# Patient Record
Sex: FEMALE | Race: WHITE | Employment: OTHER | URBAN - METROPOLITAN AREA
[De-identification: names, ages, dates, MRNs, and addresses within clinical notes are randomized per-mention and may not be internally consistent; named-entity substitution may affect disease eponyms.]

---

## 2017-11-07 PROBLEM — M17.11 PRIMARY OSTEOARTHRITIS OF RIGHT KNEE: Chronic | Status: ACTIVE | Noted: 2017-11-07

## 2017-12-04 ENCOUNTER — CARE COORDINATION (OUTPATIENT)
Dept: CASE MANAGEMENT | Age: 72
End: 2017-12-04

## 2018-03-14 ENCOUNTER — PREP FOR PROCEDURE (OUTPATIENT)
Dept: SURGERY | Age: 73
End: 2018-03-14

## 2018-03-14 ASSESSMENT — ENCOUNTER SYMPTOMS
EYES NEGATIVE: 1
RESPIRATORY NEGATIVE: 1
HEARTBURN: 1
BACK PAIN: 1

## 2018-03-15 ENCOUNTER — APPOINTMENT (OUTPATIENT)
Dept: GENERAL RADIOLOGY | Age: 73
DRG: 470 | End: 2018-03-15
Attending: ORTHOPAEDIC SURGERY
Payer: MEDICARE

## 2018-03-15 ENCOUNTER — ANESTHESIA EVENT (OUTPATIENT)
Dept: OPERATING ROOM | Age: 73
DRG: 470 | End: 2018-03-15
Payer: MEDICARE

## 2018-03-15 ENCOUNTER — HOSPITAL ENCOUNTER (INPATIENT)
Age: 73
LOS: 2 days | Discharge: HOME HEALTH | DRG: 470 | End: 2018-03-17
Attending: ORTHOPAEDIC SURGERY | Admitting: ORTHOPAEDIC SURGERY
Payer: MEDICARE

## 2018-03-15 ENCOUNTER — ANESTHESIA (OUTPATIENT)
Dept: OPERATING ROOM | Age: 73
DRG: 470 | End: 2018-03-15
Payer: MEDICARE

## 2018-03-15 VITALS — SYSTOLIC BLOOD PRESSURE: 129 MMHG | TEMPERATURE: 95.9 F | DIASTOLIC BLOOD PRESSURE: 62 MMHG | OXYGEN SATURATION: 94 %

## 2018-03-15 DIAGNOSIS — M17.11 PRIMARY LOCALIZED OSTEOARTHRITIS OF RIGHT KNEE: Chronic | ICD-10-CM

## 2018-03-15 DIAGNOSIS — Z96.652 PRESENCE OF LEFT ARTIFICIAL KNEE JOINT: ICD-10-CM

## 2018-03-15 DIAGNOSIS — Z01.818 PREOPERATIVE TESTING: Primary | ICD-10-CM

## 2018-03-15 PROBLEM — M19.90 OSTEOARTHRITIS: Status: ACTIVE | Noted: 2018-03-15

## 2018-03-15 PROBLEM — M17.12 PRIMARY OSTEOARTHRITIS OF LEFT KNEE: Status: ACTIVE | Noted: 2018-03-15

## 2018-03-15 PROCEDURE — 6360000002 HC RX W HCPCS: Performed by: ORTHOPAEDIC SURGERY

## 2018-03-15 PROCEDURE — 6370000000 HC RX 637 (ALT 250 FOR IP): Performed by: ORTHOPAEDIC SURGERY

## 2018-03-15 PROCEDURE — 6370000000 HC RX 637 (ALT 250 FOR IP): Performed by: ANESTHESIOLOGY

## 2018-03-15 PROCEDURE — C1713 ANCHOR/SCREW BN/BN,TIS/BN: HCPCS | Performed by: ORTHOPAEDIC SURGERY

## 2018-03-15 PROCEDURE — 3E0T3BZ INTRODUCTION OF ANESTHETIC AGENT INTO PERIPHERAL NERVES AND PLEXI, PERCUTANEOUS APPROACH: ICD-10-PCS | Performed by: ANESTHESIOLOGY

## 2018-03-15 PROCEDURE — 2500000003 HC RX 250 WO HCPCS: Performed by: NURSE ANESTHETIST, CERTIFIED REGISTERED

## 2018-03-15 PROCEDURE — 2580000003 HC RX 258: Performed by: ORTHOPAEDIC SURGERY

## 2018-03-15 PROCEDURE — 6360000002 HC RX W HCPCS: Performed by: ANESTHESIOLOGY

## 2018-03-15 PROCEDURE — 3700000000 HC ANESTHESIA ATTENDED CARE: Performed by: ORTHOPAEDIC SURGERY

## 2018-03-15 PROCEDURE — 3600000015 HC SURGERY LEVEL 5 ADDTL 15MIN: Performed by: ORTHOPAEDIC SURGERY

## 2018-03-15 PROCEDURE — 0SRD0J9 REPLACEMENT OF LEFT KNEE JOINT WITH SYNTHETIC SUBSTITUTE, CEMENTED, OPEN APPROACH: ICD-10-PCS | Performed by: ORTHOPAEDIC SURGERY

## 2018-03-15 PROCEDURE — 2580000003 HC RX 258: Performed by: NURSE ANESTHETIST, CERTIFIED REGISTERED

## 2018-03-15 PROCEDURE — 64447 NJX AA&/STRD FEMORAL NRV IMG: CPT | Performed by: ANESTHESIOLOGY

## 2018-03-15 PROCEDURE — S0028 INJECTION, FAMOTIDINE, 20 MG: HCPCS | Performed by: ORTHOPAEDIC SURGERY

## 2018-03-15 PROCEDURE — 2580000003 HC RX 258: Performed by: PHYSICIAN ASSISTANT

## 2018-03-15 PROCEDURE — 3600000005 HC SURGERY LEVEL 5 BASE: Performed by: ORTHOPAEDIC SURGERY

## 2018-03-15 PROCEDURE — 2500000003 HC RX 250 WO HCPCS: Performed by: PHYSICIAN ASSISTANT

## 2018-03-15 PROCEDURE — 7100000001 HC PACU RECOVERY - ADDTL 15 MIN: Performed by: ORTHOPAEDIC SURGERY

## 2018-03-15 PROCEDURE — 2500000003 HC RX 250 WO HCPCS: Performed by: ORTHOPAEDIC SURGERY

## 2018-03-15 PROCEDURE — 6360000002 HC RX W HCPCS

## 2018-03-15 PROCEDURE — 3700000001 HC ADD 15 MINUTES (ANESTHESIA): Performed by: ORTHOPAEDIC SURGERY

## 2018-03-15 PROCEDURE — 94664 DEMO&/EVAL PT USE INHALER: CPT

## 2018-03-15 PROCEDURE — 6360000002 HC RX W HCPCS: Performed by: PHYSICIAN ASSISTANT

## 2018-03-15 PROCEDURE — 88305 TISSUE EXAM BY PATHOLOGIST: CPT

## 2018-03-15 PROCEDURE — 6360000002 HC RX W HCPCS: Performed by: NURSE ANESTHETIST, CERTIFIED REGISTERED

## 2018-03-15 PROCEDURE — 7100000000 HC PACU RECOVERY - FIRST 15 MIN: Performed by: ORTHOPAEDIC SURGERY

## 2018-03-15 PROCEDURE — 2720000010 HC SURG SUPPLY STERILE: Performed by: ORTHOPAEDIC SURGERY

## 2018-03-15 PROCEDURE — 73560 X-RAY EXAM OF KNEE 1 OR 2: CPT

## 2018-03-15 PROCEDURE — 6360000002 HC RX W HCPCS: Performed by: PEDIATRICS

## 2018-03-15 PROCEDURE — C1776 JOINT DEVICE (IMPLANTABLE): HCPCS | Performed by: ORTHOPAEDIC SURGERY

## 2018-03-15 PROCEDURE — 88311 DECALCIFY TISSUE: CPT

## 2018-03-15 PROCEDURE — 1200000000 HC SEMI PRIVATE

## 2018-03-15 DEVICE — BEARING TIB AP71MM ML75MM THK10MM KNEE ARCM POST STBL MOD: Type: IMPLANTABLE DEVICE | Site: KNEE | Status: FUNCTIONAL

## 2018-03-15 DEVICE — DISCONTINUED USE 415964 CEMENT PALACOS R + G SING DOSE 40GR: Type: IMPLANTABLE DEVICE | Site: KNEE | Status: FUNCTIONAL

## 2018-03-15 DEVICE — TRAY TIB L75MM KNEE CO CHROM I BEAM MOD INTLOK CEM VANGUARD: Type: IMPLANTABLE DEVICE | Site: KNEE | Status: FUNCTIONAL

## 2018-03-15 DEVICE — PEG BNE FIX DST FEM KNEE CO CHROM MOLYBDENUM ALLY VANGUARD: Type: IMPLANTABLE DEVICE | Site: KNEE | Status: FUNCTIONAL

## 2018-03-15 DEVICE — COMPONENT PAT DIA34MM THK85MM KNEE TI ALLY S STL UHMWPE 3 PE: Type: IMPLANTABLE DEVICE | Site: KNEE | Status: FUNCTIONAL

## 2018-03-15 DEVICE — IMPLANTABLE DEVICE: Type: IMPLANTABLE DEVICE | Site: KNEE | Status: FUNCTIONAL

## 2018-03-15 RX ORDER — ROPIVACAINE HYDROCHLORIDE 5 MG/ML
30 INJECTION, SOLUTION EPIDURAL; INFILTRATION; PERINEURAL ONCE
Status: COMPLETED | OUTPATIENT
Start: 2018-03-15 | End: 2018-03-15

## 2018-03-15 RX ORDER — GABAPENTIN 100 MG/1
200 CAPSULE ORAL ONCE
Status: COMPLETED | OUTPATIENT
Start: 2018-03-15 | End: 2018-03-15

## 2018-03-15 RX ORDER — GLYCOPYRROLATE 0.2 MG/ML
INJECTION INTRAMUSCULAR; INTRAVENOUS PRN
Status: DISCONTINUED | OUTPATIENT
Start: 2018-03-15 | End: 2018-03-15 | Stop reason: SDUPTHER

## 2018-03-15 RX ORDER — FENTANYL CITRATE 0.05 MG/ML
INJECTION, SOLUTION INTRAMUSCULAR; INTRAVENOUS PRN
Status: DISCONTINUED | OUTPATIENT
Start: 2018-03-15 | End: 2018-03-15

## 2018-03-15 RX ORDER — MEPERIDINE HYDROCHLORIDE 25 MG/ML
12.5 INJECTION INTRAMUSCULAR; INTRAVENOUS; SUBCUTANEOUS EVERY 5 MIN PRN
Status: DISCONTINUED | OUTPATIENT
Start: 2018-03-15 | End: 2018-03-15 | Stop reason: HOSPADM

## 2018-03-15 RX ORDER — ONDANSETRON 2 MG/ML
4 INJECTION INTRAMUSCULAR; INTRAVENOUS EVERY 6 HOURS PRN
Status: DISCONTINUED | OUTPATIENT
Start: 2018-03-15 | End: 2018-03-17 | Stop reason: HOSPADM

## 2018-03-15 RX ORDER — DIPHENHYDRAMINE HYDROCHLORIDE 50 MG/ML
12.5 INJECTION INTRAMUSCULAR; INTRAVENOUS
Status: DISCONTINUED | OUTPATIENT
Start: 2018-03-15 | End: 2018-03-15 | Stop reason: HOSPADM

## 2018-03-15 RX ORDER — CELECOXIB 100 MG/1
200 CAPSULE ORAL ONCE
Status: COMPLETED | OUTPATIENT
Start: 2018-03-15 | End: 2018-03-15

## 2018-03-15 RX ORDER — ROCURONIUM BROMIDE 10 MG/ML
INJECTION, SOLUTION INTRAVENOUS PRN
Status: DISCONTINUED | OUTPATIENT
Start: 2018-03-15 | End: 2018-03-15 | Stop reason: SDUPTHER

## 2018-03-15 RX ORDER — LIDOCAINE HYDROCHLORIDE 20 MG/ML
INJECTION, SOLUTION INFILTRATION; PERINEURAL PRN
Status: DISCONTINUED | OUTPATIENT
Start: 2018-03-15 | End: 2018-03-15 | Stop reason: SDUPTHER

## 2018-03-15 RX ORDER — SODIUM CHLORIDE, SODIUM LACTATE, POTASSIUM CHLORIDE, CALCIUM CHLORIDE 600; 310; 30; 20 MG/100ML; MG/100ML; MG/100ML; MG/100ML
INJECTION, SOLUTION INTRAVENOUS CONTINUOUS PRN
Status: DISCONTINUED | OUTPATIENT
Start: 2018-03-15 | End: 2018-03-15 | Stop reason: SDUPTHER

## 2018-03-15 RX ORDER — SENNA AND DOCUSATE SODIUM 50; 8.6 MG/1; MG/1
2 TABLET, FILM COATED ORAL NIGHTLY
Status: DISCONTINUED | OUTPATIENT
Start: 2018-03-15 | End: 2018-03-17 | Stop reason: HOSPADM

## 2018-03-15 RX ORDER — METOPROLOL SUCCINATE 50 MG/1
50 TABLET, EXTENDED RELEASE ORAL 2 TIMES DAILY
Status: DISCONTINUED | OUTPATIENT
Start: 2018-03-15 | End: 2018-03-17 | Stop reason: HOSPADM

## 2018-03-15 RX ORDER — ACETAMINOPHEN 500 MG
1000 TABLET ORAL ONCE
Status: COMPLETED | OUTPATIENT
Start: 2018-03-15 | End: 2018-03-15

## 2018-03-15 RX ORDER — MORPHINE SULFATE 2 MG/ML
2 INJECTION, SOLUTION INTRAMUSCULAR; INTRAVENOUS
Status: DISCONTINUED | OUTPATIENT
Start: 2018-03-15 | End: 2018-03-17 | Stop reason: HOSPADM

## 2018-03-15 RX ORDER — DOCUSATE SODIUM 100 MG/1
100 CAPSULE, LIQUID FILLED ORAL 2 TIMES DAILY
Status: DISCONTINUED | OUTPATIENT
Start: 2018-03-15 | End: 2018-03-17 | Stop reason: HOSPADM

## 2018-03-15 RX ORDER — OXYCODONE HYDROCHLORIDE AND ACETAMINOPHEN 5; 325 MG/1; MG/1
1 TABLET ORAL EVERY 4 HOURS PRN
Status: DISCONTINUED | OUTPATIENT
Start: 2018-03-15 | End: 2018-03-17 | Stop reason: HOSPADM

## 2018-03-15 RX ORDER — DEXAMETHASONE SODIUM PHOSPHATE 10 MG/ML
INJECTION, SOLUTION INTRAMUSCULAR; INTRAVENOUS
Status: COMPLETED
Start: 2018-03-15 | End: 2018-03-15

## 2018-03-15 RX ORDER — LEVOTHYROXINE SODIUM 0.15 MG/1
150 TABLET ORAL DAILY
Status: DISCONTINUED | OUTPATIENT
Start: 2018-03-15 | End: 2018-03-17 | Stop reason: HOSPADM

## 2018-03-15 RX ORDER — DEXAMETHASONE SODIUM PHOSPHATE 4 MG/ML
INJECTION, SOLUTION INTRA-ARTICULAR; INTRALESIONAL; INTRAMUSCULAR; INTRAVENOUS; SOFT TISSUE PRN
Status: DISCONTINUED | OUTPATIENT
Start: 2018-03-15 | End: 2018-03-15 | Stop reason: SDUPTHER

## 2018-03-15 RX ORDER — PROMETHAZINE HYDROCHLORIDE 25 MG/ML
6.25 INJECTION, SOLUTION INTRAMUSCULAR; INTRAVENOUS
Status: DISCONTINUED | OUTPATIENT
Start: 2018-03-15 | End: 2018-03-15 | Stop reason: HOSPADM

## 2018-03-15 RX ORDER — SODIUM CHLORIDE 0.9 % (FLUSH) 0.9 %
10 SYRINGE (ML) INJECTION PRN
Status: DISCONTINUED | OUTPATIENT
Start: 2018-03-15 | End: 2018-03-17 | Stop reason: HOSPADM

## 2018-03-15 RX ORDER — PROPOFOL 10 MG/ML
INJECTION, EMULSION INTRAVENOUS PRN
Status: DISCONTINUED | OUTPATIENT
Start: 2018-03-15 | End: 2018-03-15 | Stop reason: SDUPTHER

## 2018-03-15 RX ORDER — SODIUM CHLORIDE 0.9 % (FLUSH) 0.9 %
10 SYRINGE (ML) INJECTION EVERY 12 HOURS SCHEDULED
Status: DISCONTINUED | OUTPATIENT
Start: 2018-03-15 | End: 2018-03-17 | Stop reason: HOSPADM

## 2018-03-15 RX ORDER — FENTANYL CITRATE 50 UG/ML
INJECTION, SOLUTION INTRAMUSCULAR; INTRAVENOUS PRN
Status: DISCONTINUED | OUTPATIENT
Start: 2018-03-15 | End: 2018-03-15 | Stop reason: SDUPTHER

## 2018-03-15 RX ORDER — OXYCODONE HYDROCHLORIDE AND ACETAMINOPHEN 5; 325 MG/1; MG/1
2 TABLET ORAL EVERY 4 HOURS PRN
Status: DISCONTINUED | OUTPATIENT
Start: 2018-03-15 | End: 2018-03-17 | Stop reason: HOSPADM

## 2018-03-15 RX ORDER — FENTANYL CITRATE 50 UG/ML
50 INJECTION, SOLUTION INTRAMUSCULAR; INTRAVENOUS EVERY 5 MIN PRN
Status: DISCONTINUED | OUTPATIENT
Start: 2018-03-15 | End: 2018-03-15 | Stop reason: HOSPADM

## 2018-03-15 RX ORDER — ALBUTEROL SULFATE 2.5 MG/3ML
2.5 SOLUTION RESPIRATORY (INHALATION)
Status: COMPLETED | OUTPATIENT
Start: 2018-03-15 | End: 2018-03-15

## 2018-03-15 RX ORDER — OXYCODONE HYDROCHLORIDE 5 MG/1
5 TABLET ORAL ONCE
Status: COMPLETED | OUTPATIENT
Start: 2018-03-15 | End: 2018-03-15

## 2018-03-15 RX ORDER — SODIUM CHLORIDE, SODIUM LACTATE, POTASSIUM CHLORIDE, CALCIUM CHLORIDE 600; 310; 30; 20 MG/100ML; MG/100ML; MG/100ML; MG/100ML
INJECTION, SOLUTION INTRAVENOUS CONTINUOUS
Status: DISCONTINUED | OUTPATIENT
Start: 2018-03-15 | End: 2018-03-15

## 2018-03-15 RX ORDER — MIDAZOLAM HYDROCHLORIDE 1 MG/ML
1 INJECTION INTRAMUSCULAR; INTRAVENOUS PRN
Status: DISCONTINUED | OUTPATIENT
Start: 2018-03-15 | End: 2018-03-15 | Stop reason: HOSPADM

## 2018-03-15 RX ORDER — HYDROMORPHONE HCL 110MG/55ML
PATIENT CONTROLLED ANALGESIA SYRINGE INTRAVENOUS
Status: DISPENSED
Start: 2018-03-15 | End: 2018-03-16

## 2018-03-15 RX ORDER — FENTANYL CITRATE 50 UG/ML
25 INJECTION, SOLUTION INTRAMUSCULAR; INTRAVENOUS EVERY 5 MIN PRN
Status: DISCONTINUED | OUTPATIENT
Start: 2018-03-15 | End: 2018-03-15 | Stop reason: HOSPADM

## 2018-03-15 RX ORDER — ONDANSETRON 2 MG/ML
INJECTION INTRAMUSCULAR; INTRAVENOUS PRN
Status: DISCONTINUED | OUTPATIENT
Start: 2018-03-15 | End: 2018-03-15 | Stop reason: SDUPTHER

## 2018-03-15 RX ORDER — NEOSTIGMINE METHYLSULFATE 1 MG/ML
INJECTION, SOLUTION INTRAVENOUS PRN
Status: DISCONTINUED | OUTPATIENT
Start: 2018-03-15 | End: 2018-03-15 | Stop reason: SDUPTHER

## 2018-03-15 RX ORDER — MIDAZOLAM HYDROCHLORIDE 1 MG/ML
INJECTION INTRAMUSCULAR; INTRAVENOUS PRN
Status: DISCONTINUED | OUTPATIENT
Start: 2018-03-15 | End: 2018-03-15 | Stop reason: SDUPTHER

## 2018-03-15 RX ORDER — SODIUM CHLORIDE, SODIUM LACTATE, POTASSIUM CHLORIDE, CALCIUM CHLORIDE 600; 310; 30; 20 MG/100ML; MG/100ML; MG/100ML; MG/100ML
INJECTION, SOLUTION INTRAVENOUS CONTINUOUS
Status: DISCONTINUED | OUTPATIENT
Start: 2018-03-15 | End: 2018-03-17 | Stop reason: HOSPADM

## 2018-03-15 RX ORDER — OXYCODONE HYDROCHLORIDE AND ACETAMINOPHEN 5; 325 MG/1; MG/1
1 TABLET ORAL
Status: DISCONTINUED | OUTPATIENT
Start: 2018-03-15 | End: 2018-03-15 | Stop reason: HOSPADM

## 2018-03-15 RX ORDER — ALBUTEROL SULFATE 2.5 MG/3ML
2.5 SOLUTION RESPIRATORY (INHALATION) ONCE
Status: COMPLETED | OUTPATIENT
Start: 2018-03-15 | End: 2018-03-15

## 2018-03-15 RX ADMIN — MIDAZOLAM HYDROCHLORIDE 2 MG: 1 INJECTION, SOLUTION INTRAMUSCULAR; INTRAVENOUS at 10:16

## 2018-03-15 RX ADMIN — DEXAMETHASONE SODIUM PHOSPHATE 10 MG: 4 INJECTION, SOLUTION INTRA-ARTICULAR; INTRALESIONAL; INTRAMUSCULAR; INTRAVENOUS; SOFT TISSUE at 12:22

## 2018-03-15 RX ADMIN — PROPOFOL 200 MG: 10 INJECTION, EMULSION INTRAVENOUS at 11:51

## 2018-03-15 RX ADMIN — HYDROMORPHONE HYDROCHLORIDE 0.5 MG: 1 INJECTION, SOLUTION INTRAMUSCULAR; INTRAVENOUS; SUBCUTANEOUS at 13:49

## 2018-03-15 RX ADMIN — Medication 0.6 MG: at 13:22

## 2018-03-15 RX ADMIN — SODIUM CHLORIDE, POTASSIUM CHLORIDE, SODIUM LACTATE AND CALCIUM CHLORIDE: 600; 310; 30; 20 INJECTION, SOLUTION INTRAVENOUS at 16:05

## 2018-03-15 RX ADMIN — DOCUSATE SODIUM AND SENNOSIDES 2 TABLET: 8.6; 5 TABLET, FILM COATED ORAL at 22:15

## 2018-03-15 RX ADMIN — ROCURONIUM BROMIDE 40 MG: 10 SOLUTION INTRAVENOUS at 11:51

## 2018-03-15 RX ADMIN — CEFAZOLIN SODIUM 3 G: 10 INJECTION, POWDER, FOR SOLUTION INTRAVENOUS at 11:14

## 2018-03-15 RX ADMIN — PROPOFOL 50 MG: 10 INJECTION, EMULSION INTRAVENOUS at 13:15

## 2018-03-15 RX ADMIN — FENTANYL CITRATE 75 MCG: 50 INJECTION, SOLUTION INTRAMUSCULAR; INTRAVENOUS at 11:44

## 2018-03-15 RX ADMIN — OXYCODONE HYDROCHLORIDE AND ACETAMINOPHEN 2 TABLET: 5; 325 TABLET ORAL at 22:15

## 2018-03-15 RX ADMIN — GABAPENTIN 200 MG: 100 CAPSULE ORAL at 09:43

## 2018-03-15 RX ADMIN — FENTANYL CITRATE 100 MCG: 50 INJECTION, SOLUTION INTRAMUSCULAR; INTRAVENOUS at 12:25

## 2018-03-15 RX ADMIN — OXYCODONE HYDROCHLORIDE AND ACETAMINOPHEN 2 TABLET: 5; 325 TABLET ORAL at 17:52

## 2018-03-15 RX ADMIN — FENTANYL CITRATE 50 MCG: 50 INJECTION, SOLUTION INTRAMUSCULAR; INTRAVENOUS at 10:16

## 2018-03-15 RX ADMIN — Medication 3 MG: at 13:22

## 2018-03-15 RX ADMIN — DOCUSATE SODIUM 100 MG: 100 CAPSULE, LIQUID FILLED ORAL at 22:15

## 2018-03-15 RX ADMIN — HYDROMORPHONE HYDROCHLORIDE 0.5 MG: 1 INJECTION, SOLUTION INTRAMUSCULAR; INTRAVENOUS; SUBCUTANEOUS at 14:02

## 2018-03-15 RX ADMIN — ALBUTEROL SULFATE 2.5 MG: 2.5 SOLUTION RESPIRATORY (INHALATION) at 09:58

## 2018-03-15 RX ADMIN — LIDOCAINE HYDROCHLORIDE 100 MG: 20 INJECTION, SOLUTION INFILTRATION; PERINEURAL at 11:51

## 2018-03-15 RX ADMIN — HYDROMORPHONE HYDROCHLORIDE 0.5 MG: 1 INJECTION, SOLUTION INTRAMUSCULAR; INTRAVENOUS; SUBCUTANEOUS at 13:56

## 2018-03-15 RX ADMIN — FAMOTIDINE 20 MG: 10 INJECTION, SOLUTION INTRAVENOUS at 22:15

## 2018-03-15 RX ADMIN — SODIUM CHLORIDE, POTASSIUM CHLORIDE, SODIUM LACTATE AND CALCIUM CHLORIDE: 600; 310; 30; 20 INJECTION, SOLUTION INTRAVENOUS at 11:14

## 2018-03-15 RX ADMIN — HYDROMORPHONE HYDROCHLORIDE 0.5 MG: 1 INJECTION, SOLUTION INTRAMUSCULAR; INTRAVENOUS; SUBCUTANEOUS at 14:10

## 2018-03-15 RX ADMIN — ACETAMINOPHEN 1000 MG: 500 TABLET ORAL at 09:42

## 2018-03-15 RX ADMIN — SODIUM CHLORIDE, POTASSIUM CHLORIDE, SODIUM LACTATE AND CALCIUM CHLORIDE: 600; 310; 30; 20 INJECTION, SOLUTION INTRAVENOUS at 12:28

## 2018-03-15 RX ADMIN — DEXAMETHASONE SODIUM PHOSPHATE 4 MG: 10 INJECTION, SOLUTION INTRAMUSCULAR; INTRAVENOUS at 10:33

## 2018-03-15 RX ADMIN — ONDANSETRON 4 MG: 2 INJECTION INTRAMUSCULAR; INTRAVENOUS at 17:51

## 2018-03-15 RX ADMIN — MIDAZOLAM HYDROCHLORIDE 2 MG: 1 INJECTION, SOLUTION INTRAMUSCULAR; INTRAVENOUS at 11:40

## 2018-03-15 RX ADMIN — FENTANYL CITRATE 25 MCG: 50 INJECTION, SOLUTION INTRAMUSCULAR; INTRAVENOUS at 11:21

## 2018-03-15 RX ADMIN — METOPROLOL SUCCINATE 50 MG: 50 TABLET, FILM COATED, EXTENDED RELEASE ORAL at 23:09

## 2018-03-15 RX ADMIN — ALBUTEROL SULFATE 2.5 MG: 2.5 SOLUTION RESPIRATORY (INHALATION) at 14:34

## 2018-03-15 RX ADMIN — SODIUM CHLORIDE, POTASSIUM CHLORIDE, SODIUM LACTATE AND CALCIUM CHLORIDE: 600; 310; 30; 20 INJECTION, SOLUTION INTRAVENOUS at 13:30

## 2018-03-15 RX ADMIN — OXYCODONE HYDROCHLORIDE 5 MG: 5 TABLET ORAL at 09:43

## 2018-03-15 RX ADMIN — ONDANSETRON 4 MG: 2 INJECTION, SOLUTION INTRAMUSCULAR; INTRAVENOUS at 12:22

## 2018-03-15 RX ADMIN — PROPOFOL 25 MG: 10 INJECTION, EMULSION INTRAVENOUS at 13:32

## 2018-03-15 RX ADMIN — ROPIVACAINE HYDROCHLORIDE 30 ML: 5 INJECTION, SOLUTION EPIDURAL; INFILTRATION; PERINEURAL at 10:33

## 2018-03-15 RX ADMIN — CEFAZOLIN SODIUM 3 G: 10 INJECTION, POWDER, FOR SOLUTION INTRAVENOUS at 18:10

## 2018-03-15 RX ADMIN — SODIUM CHLORIDE, POTASSIUM CHLORIDE, SODIUM LACTATE AND CALCIUM CHLORIDE: 600; 310; 30; 20 INJECTION, SOLUTION INTRAVENOUS at 13:40

## 2018-03-15 RX ADMIN — SODIUM CHLORIDE, POTASSIUM CHLORIDE, SODIUM LACTATE AND CALCIUM CHLORIDE: 600; 310; 30; 20 INJECTION, SOLUTION INTRAVENOUS at 13:37

## 2018-03-15 RX ADMIN — CELECOXIB 100 MG: 100 CAPSULE ORAL at 09:43

## 2018-03-15 ASSESSMENT — PULMONARY FUNCTION TESTS
PIF_VALUE: 17
PIF_VALUE: 25
PIF_VALUE: 17
PIF_VALUE: 25
PIF_VALUE: 1
PIF_VALUE: 0
PIF_VALUE: 16
PIF_VALUE: 18
PIF_VALUE: 33
PIF_VALUE: 0
PIF_VALUE: 3
PIF_VALUE: 19
PIF_VALUE: 23
PIF_VALUE: 21
PIF_VALUE: 0
PIF_VALUE: 1
PIF_VALUE: 0
PIF_VALUE: 0
PIF_VALUE: 25
PIF_VALUE: 0
PIF_VALUE: 2
PIF_VALUE: 17
PIF_VALUE: 22
PIF_VALUE: 0
PIF_VALUE: 25
PIF_VALUE: 26
PIF_VALUE: 22
PIF_VALUE: 19
PIF_VALUE: 13
PIF_VALUE: 4
PIF_VALUE: 0
PIF_VALUE: 25
PIF_VALUE: 25
PIF_VALUE: 0
PIF_VALUE: 0
PIF_VALUE: 21
PIF_VALUE: 19
PIF_VALUE: 2
PIF_VALUE: 25
PIF_VALUE: 21
PIF_VALUE: 24
PIF_VALUE: 21
PIF_VALUE: 25
PIF_VALUE: 4
PIF_VALUE: 0
PIF_VALUE: 25
PIF_VALUE: 21
PIF_VALUE: 3
PIF_VALUE: 19
PIF_VALUE: 0
PIF_VALUE: 19
PIF_VALUE: 25
PIF_VALUE: 0
PIF_VALUE: 25
PIF_VALUE: 19
PIF_VALUE: 22
PIF_VALUE: 4
PIF_VALUE: 0
PIF_VALUE: 4
PIF_VALUE: 0
PIF_VALUE: 25
PIF_VALUE: 31
PIF_VALUE: 19
PIF_VALUE: 0
PIF_VALUE: 0
PIF_VALUE: 17
PIF_VALUE: 30
PIF_VALUE: 0
PIF_VALUE: 25
PIF_VALUE: 24
PIF_VALUE: 25
PIF_VALUE: 25
PIF_VALUE: 19
PIF_VALUE: 0
PIF_VALUE: 24
PIF_VALUE: 20
PIF_VALUE: 0
PIF_VALUE: 3
PIF_VALUE: 25
PIF_VALUE: 19
PIF_VALUE: 20
PIF_VALUE: 25
PIF_VALUE: 19
PIF_VALUE: 15
PIF_VALUE: 24
PIF_VALUE: 3
PIF_VALUE: 0
PIF_VALUE: 25
PIF_VALUE: 0
PIF_VALUE: 18
PIF_VALUE: 24
PIF_VALUE: 27
PIF_VALUE: 19
PIF_VALUE: 18
PIF_VALUE: 15
PIF_VALUE: 0
PIF_VALUE: 17
PIF_VALUE: 20
PIF_VALUE: 19
PIF_VALUE: 24
PIF_VALUE: 0
PIF_VALUE: 19
PIF_VALUE: 25
PIF_VALUE: 17
PIF_VALUE: 25
PIF_VALUE: 25
PIF_VALUE: 4
PIF_VALUE: 21
PIF_VALUE: 19
PIF_VALUE: 24
PIF_VALUE: 21
PIF_VALUE: 19
PIF_VALUE: 1
PIF_VALUE: 0
PIF_VALUE: 5
PIF_VALUE: 25
PIF_VALUE: 25
PIF_VALUE: 26
PIF_VALUE: 26
PIF_VALUE: 0
PIF_VALUE: 5
PIF_VALUE: 25
PIF_VALUE: 0
PIF_VALUE: 21
PIF_VALUE: 5
PIF_VALUE: 24
PIF_VALUE: 0
PIF_VALUE: 21
PIF_VALUE: 26
PIF_VALUE: 4
PIF_VALUE: 0
PIF_VALUE: 20
PIF_VALUE: 0
PIF_VALUE: 21
PIF_VALUE: 21
PIF_VALUE: 25

## 2018-03-15 ASSESSMENT — PAIN DESCRIPTION - DESCRIPTORS
DESCRIPTORS: THROBBING
DESCRIPTORS: ACHING
DESCRIPTORS: THROBBING
DESCRIPTORS: ACHING
DESCRIPTORS: ACHING
DESCRIPTORS: ACHING;DISCOMFORT
DESCRIPTORS: THROBBING
DESCRIPTORS: THROBBING

## 2018-03-15 ASSESSMENT — PAIN DESCRIPTION - PAIN TYPE
TYPE: SURGICAL PAIN

## 2018-03-15 ASSESSMENT — PAIN DESCRIPTION - ONSET
ONSET: ON-GOING
ONSET: ON-GOING

## 2018-03-15 ASSESSMENT — PAIN SCALES - GENERAL
PAINLEVEL_OUTOF10: 10
PAINLEVEL_OUTOF10: 0
PAINLEVEL_OUTOF10: 0
PAINLEVEL_OUTOF10: 10
PAINLEVEL_OUTOF10: 0
PAINLEVEL_OUTOF10: 7
PAINLEVEL_OUTOF10: 0
PAINLEVEL_OUTOF10: 5
PAINLEVEL_OUTOF10: 10
PAINLEVEL_OUTOF10: 7
PAINLEVEL_OUTOF10: 4
PAINLEVEL_OUTOF10: 7
PAINLEVEL_OUTOF10: 10

## 2018-03-15 ASSESSMENT — PAIN DESCRIPTION - ORIENTATION
ORIENTATION: LEFT

## 2018-03-15 ASSESSMENT — PAIN DESCRIPTION - LOCATION
LOCATION: KNEE

## 2018-03-15 ASSESSMENT — PAIN DESCRIPTION - PROGRESSION
CLINICAL_PROGRESSION: GRADUALLY WORSENING
CLINICAL_PROGRESSION: GRADUALLY WORSENING

## 2018-03-15 ASSESSMENT — PAIN DESCRIPTION - FREQUENCY
FREQUENCY: CONTINUOUS
FREQUENCY: CONTINUOUS

## 2018-03-15 ASSESSMENT — PAIN - FUNCTIONAL ASSESSMENT: PAIN_FUNCTIONAL_ASSESSMENT: 0-10

## 2018-03-15 NOTE — PROGRESS NOTES
Patient dangled at bedside, stood with front wheeled walker. Took several steps to chair, tolerated very well.

## 2018-03-15 NOTE — PROGRESS NOTES
Nursing Transfer Note    Data:  Summary of patients progress: stable  Reason for transfer: inpatient    Action:  Explained reason for transfer to Patient and Family. Report given to: [facility] VICTOR MANUEL RN on OROS, using [employer].   Mode of transportation:  bed    Response:  RN Recommendations:

## 2018-03-15 NOTE — H&P
I have examined the patient. I have reviewed the history and physical and find no  relevant changes. I have reviewed with the patient and/or family the risks, benefits, and  alternatives to the procedure.

## 2018-03-15 NOTE — ANESTHESIA POSTPROCEDURE EVALUATION
Department of Anesthesiology  Postprocedure Note    Patient: Enriqueta Craig  MRN: [id number]  YOB: 1945  Date of evaluation: 3/15/2018  Time:  4:54 PM     Procedure Summary     Date:  03/15/18 Room / Location:  [facility] OR 04 / [facility] OR    Anesthesia Start:  1114 Anesthesia Stop:  1346    Procedure:  LEFT TOTAL JOINT ARTHROPLASTY   KNEE (BIOMET)  --PNB-- (Left ) Diagnosis:  (OSTEOARTHRITIS )    Surgeon:  Mariella Hays MD Responsible Provider:  Matthias Leija MD    Anesthesia Type:  spinal, regional, general ASA Status:  3          Anesthesia Type: spinal, regional, general    Bean Phase I: Bean Score: 9    Bean Phase II:      Last vitals: Reviewed and per EMR flowsheets.        Anesthesia Post Evaluation    Patient location during evaluation: PACU  Level of consciousness: awake and alert  Airway patency: patent  Nausea & Vomiting: no nausea and no vomiting  Complications: no  Cardiovascular status: blood pressure returned to baseline  Respiratory status: acceptable

## 2018-03-16 LAB
ANION GAP SERPL CALCULATED.3IONS-SCNC: 17 MMOL/L (ref 7–16)
BUN BLDV-MCNC: 15 MG/DL (ref 8–23)
CALCIUM SERPL-MCNC: 9.2 MG/DL (ref 8.6–10.2)
CHLORIDE BLD-SCNC: 99 MMOL/L (ref 98–107)
CO2: 20 MMOL/L (ref 22–29)
CREAT SERPL-MCNC: 0.8 MG/DL (ref 0.5–1)
GFR AFRICAN AMERICAN: >60
GFR NON-AFRICAN AMERICAN: >60 ML/MIN/1.73
GLUCOSE BLD-MCNC: 160 MG/DL (ref 74–109)
HCT VFR BLD CALC: 34.7 % (ref 34–48)
HEMOGLOBIN: 10.9 G/DL (ref 11.5–15.5)
MCH RBC QN AUTO: 27.4 PG (ref 26–35)
MCHC RBC AUTO-ENTMCNC: 31.4 % (ref 32–34.5)
MCV RBC AUTO: 87.2 FL (ref 80–99.9)
PDW BLD-RTO: 15 FL (ref 11.5–15)
PLATELET # BLD: 176 E9/L (ref 130–450)
PMV BLD AUTO: 12.7 FL (ref 7–12)
POTASSIUM REFLEX MAGNESIUM: 5 MMOL/L (ref 3.5–5)
RBC # BLD: 3.98 E12/L (ref 3.5–5.5)
SODIUM BLD-SCNC: 136 MMOL/L (ref 132–146)
WBC # BLD: 8.9 E9/L (ref 4.5–11.5)

## 2018-03-16 PROCEDURE — 6370000000 HC RX 637 (ALT 250 FOR IP): Performed by: ORTHOPAEDIC SURGERY

## 2018-03-16 PROCEDURE — S0028 INJECTION, FAMOTIDINE, 20 MG: HCPCS | Performed by: ORTHOPAEDIC SURGERY

## 2018-03-16 PROCEDURE — 97530 THERAPEUTIC ACTIVITIES: CPT

## 2018-03-16 PROCEDURE — G8988 SELF CARE GOAL STATUS: HCPCS

## 2018-03-16 PROCEDURE — G8987 SELF CARE CURRENT STATUS: HCPCS

## 2018-03-16 PROCEDURE — 6360000002 HC RX W HCPCS: Performed by: ORTHOPAEDIC SURGERY

## 2018-03-16 PROCEDURE — G8979 MOBILITY GOAL STATUS: HCPCS

## 2018-03-16 PROCEDURE — 80048 BASIC METABOLIC PNL TOTAL CA: CPT

## 2018-03-16 PROCEDURE — 2500000003 HC RX 250 WO HCPCS: Performed by: ORTHOPAEDIC SURGERY

## 2018-03-16 PROCEDURE — 2580000003 HC RX 258: Performed by: ORTHOPAEDIC SURGERY

## 2018-03-16 PROCEDURE — 36415 COLL VENOUS BLD VENIPUNCTURE: CPT

## 2018-03-16 PROCEDURE — 85027 COMPLETE CBC AUTOMATED: CPT

## 2018-03-16 PROCEDURE — 97165 OT EVAL LOW COMPLEX 30 MIN: CPT

## 2018-03-16 PROCEDURE — 97110 THERAPEUTIC EXERCISES: CPT

## 2018-03-16 PROCEDURE — 97162 PT EVAL MOD COMPLEX 30 MIN: CPT

## 2018-03-16 PROCEDURE — G8978 MOBILITY CURRENT STATUS: HCPCS

## 2018-03-16 PROCEDURE — 1200000000 HC SEMI PRIVATE

## 2018-03-16 RX ORDER — FAMOTIDINE 20 MG/1
20 TABLET, FILM COATED ORAL 2 TIMES DAILY
Status: DISCONTINUED | OUTPATIENT
Start: 2018-03-16 | End: 2018-03-17 | Stop reason: HOSPADM

## 2018-03-16 RX ADMIN — Medication 10 ML: at 17:21

## 2018-03-16 RX ADMIN — Medication 10 ML: at 09:59

## 2018-03-16 RX ADMIN — OXYCODONE HYDROCHLORIDE AND ACETAMINOPHEN 2 TABLET: 5; 325 TABLET ORAL at 06:32

## 2018-03-16 RX ADMIN — DOCUSATE SODIUM 100 MG: 100 CAPSULE, LIQUID FILLED ORAL at 09:58

## 2018-03-16 RX ADMIN — Medication 10 ML: at 20:24

## 2018-03-16 RX ADMIN — METOPROLOL SUCCINATE 50 MG: 50 TABLET, FILM COATED, EXTENDED RELEASE ORAL at 09:59

## 2018-03-16 RX ADMIN — OXYCODONE HYDROCHLORIDE AND ACETAMINOPHEN 2 TABLET: 5; 325 TABLET ORAL at 02:29

## 2018-03-16 RX ADMIN — FAMOTIDINE 20 MG: 20 TABLET, FILM COATED ORAL at 20:23

## 2018-03-16 RX ADMIN — LEVOTHYROXINE SODIUM 150 MCG: 150 TABLET ORAL at 09:58

## 2018-03-16 RX ADMIN — OXYCODONE HYDROCHLORIDE AND ACETAMINOPHEN 2 TABLET: 5; 325 TABLET ORAL at 22:54

## 2018-03-16 RX ADMIN — OXYCODONE HYDROCHLORIDE AND ACETAMINOPHEN 2 TABLET: 5; 325 TABLET ORAL at 10:43

## 2018-03-16 RX ADMIN — RIVAROXABAN 20 MG: 20 TABLET, FILM COATED ORAL at 17:13

## 2018-03-16 RX ADMIN — METOPROLOL SUCCINATE 50 MG: 50 TABLET, FILM COATED, EXTENDED RELEASE ORAL at 20:23

## 2018-03-16 RX ADMIN — OXYCODONE HYDROCHLORIDE AND ACETAMINOPHEN 2 TABLET: 5; 325 TABLET ORAL at 18:47

## 2018-03-16 RX ADMIN — FAMOTIDINE 20 MG: 10 INJECTION, SOLUTION INTRAVENOUS at 09:59

## 2018-03-16 RX ADMIN — DOCUSATE SODIUM AND SENNOSIDES 2 TABLET: 8.6; 5 TABLET, FILM COATED ORAL at 20:23

## 2018-03-16 RX ADMIN — MORPHINE SULFATE 2 MG: 2 INJECTION, SOLUTION INTRAMUSCULAR; INTRAVENOUS at 17:20

## 2018-03-16 RX ADMIN — CEFAZOLIN SODIUM 3 G: 10 INJECTION, POWDER, FOR SOLUTION INTRAVENOUS at 03:11

## 2018-03-16 RX ADMIN — OXYCODONE HYDROCHLORIDE AND ACETAMINOPHEN 2 TABLET: 5; 325 TABLET ORAL at 14:54

## 2018-03-16 RX ADMIN — DOCUSATE SODIUM 100 MG: 100 CAPSULE, LIQUID FILLED ORAL at 20:23

## 2018-03-16 ASSESSMENT — PAIN DESCRIPTION - ONSET
ONSET: ON-GOING

## 2018-03-16 ASSESSMENT — PAIN DESCRIPTION - ORIENTATION
ORIENTATION: LEFT

## 2018-03-16 ASSESSMENT — PAIN DESCRIPTION - PAIN TYPE
TYPE: SURGICAL PAIN

## 2018-03-16 ASSESSMENT — PAIN DESCRIPTION - LOCATION
LOCATION: KNEE

## 2018-03-16 ASSESSMENT — PAIN SCALES - GENERAL
PAINLEVEL_OUTOF10: 0
PAINLEVEL_OUTOF10: 10
PAINLEVEL_OUTOF10: 7
PAINLEVEL_OUTOF10: 6
PAINLEVEL_OUTOF10: 9
PAINLEVEL_OUTOF10: 7
PAINLEVEL_OUTOF10: 6
PAINLEVEL_OUTOF10: 4
PAINLEVEL_OUTOF10: 7
PAINLEVEL_OUTOF10: 5
PAINLEVEL_OUTOF10: 8
PAINLEVEL_OUTOF10: 6
PAINLEVEL_OUTOF10: 6
PAINLEVEL_OUTOF10: 8

## 2018-03-16 ASSESSMENT — PAIN DESCRIPTION - FREQUENCY
FREQUENCY: CONTINUOUS

## 2018-03-16 ASSESSMENT — PAIN DESCRIPTION - DESCRIPTORS
DESCRIPTORS: ACHING
DESCRIPTORS: ACHING;DISCOMFORT
DESCRIPTORS: ACHING
DESCRIPTORS: ACHING;DISCOMFORT

## 2018-03-16 ASSESSMENT — PAIN DESCRIPTION - PROGRESSION
CLINICAL_PROGRESSION: GRADUALLY IMPROVING
CLINICAL_PROGRESSION: GRADUALLY WORSENING
CLINICAL_PROGRESSION: GRADUALLY IMPROVING
CLINICAL_PROGRESSION: GRADUALLY IMPROVING
CLINICAL_PROGRESSION: GRADUALLY WORSENING
CLINICAL_PROGRESSION: NOT CHANGED
CLINICAL_PROGRESSION: GRADUALLY WORSENING

## 2018-03-16 NOTE — PATIENT CARE CONFERENCE
[facility] Quality Flow/Interdisciplinary Rounds Progress Note        Quality Flow Rounds held on March 16, 2018    Disciplines Attending:  Bedside Nurse, ,  and Nursing Unit Leadership    Yordan Santos was admitted on 3/15/2018  8:37 AM    Anticipated Discharge Date:  Expected Discharge Date: 03/16/18    Disposition:    Keyshawn Score:  Keyshawn Scale Score: 20    Readmission Risk              Readmission Risk:        13.5       Age 72 or Greater:  1    Admitted from SNF or Requires Paid or Family Care:  0    Currently has CHF,COPD,ARF,CRI,or is on dialysis:  0    Takes more than 5 Prescription Medications:  0    Takes Digoxin,Insulin,Anticoagulants,Narcotics or ASA/Plavix:  [facility] in Past 12 Months:  10    On Disability:  0    Patient Considers own Health:  2.5          Discussed patient goal for the day, patient clinical progression, and barriers to discharge. The following Goal(s) of the Day/Commitment(s) have been identified:  Adequate pain control/safety for progressive ambulation. Discharge planning.       [facility]  March 16, 2018

## 2018-03-16 NOTE — PROGRESS NOTES
Physical Therapy    Facility/Department: [facility] SURGERY  Initial Assessment    NAME: Lenin Huston  : 1945  MRN: [id number]    Date of Service: 3/16/2018    Evaluating Therapist: Palmira Lundberg. Manpreet Lima, P.T. Room #: 5322/9526-I  DIAGNOSIS: OA R knee  SURGERY: 3/15/18 L TKA  PRECAUTIONS: WBAT LLE, falls, Drain L knee    Social:  Pt lives with  (he has Parkinson's disease) in a 2 floor plan 3 and a 1/2 steps and 1 rails to enter. Pt has half bath and a bed on 1st floor. Prior to admission pt walked with 2 canes     Initial Evaluation  Date: 3/16/18 Treatment      Short Term/ Long Term   Goals   Was pt agreeable to Eval/treatment? yes     Does pt have pain? No c/o pain at rest, but c/o posterior L knee pain with amb     Bed Mobility  NA, pt was found and left sitting up in chair  Independent   Transfers Sit to stand: MIN A  Stand to sit: MIN A  Stand pivot: MIN A with ww  Independent   Ambulation    150 feet with ww with SBA/MIN A  300 feet with ww Independent   Stair negotiation: ascended and descended NA  4 steps with 1 rail Independent   AM-PAC Raw Score         Pt is alert and Oriented x3  BLE ROM is WFL, except L knee is 5-80°. LLE strength is grossly hip 3-/5 to 4/5, knee NA, and ankle 4/5 and RLE is grossly 4/5. Balance: standing with ww SBA  Sensation: c/o numbness R knee from her TKA in 2017  Edema: L knee  Endurance: fair+     ASSESSMENT  Pt displays functional ability as noted in the objective portion of this evaluation. Comments/Treatment:  Pt initially walked with slow unsteady gait, but as L calf loosened up she felt better and walked with improved gait and stability. After about 120 feet she c/o light headedness that came on for no apparent reason. After standing for 2 min it felt better and she was able to walk back to her room. Pt sat in chair and was given a bed sheet to work on stretching B calves.     Pt was left sitting up in chair with call light in

## 2018-03-16 NOTE — PROGRESS NOTES
Physical Therapy  Facility/Department: [address] ORTHO SURGERY  Daily Treatment Note  NAME: Honey Salazar  : 1945  MRN: [id number]    Date of Service: 3/16/2018    Evaluating Therapist: Valentina Dickerson. Jose Maria Thornton P.T. Room #: 0772/3544-U  DIAGNOSIS: OA R knee  SURGERY: 3/15/18 L TKA  PRECAUTIONS: WBAT LLE, falls, Drain L knee    Social:  Pt lives with  (he has Parkinson's disease) in a 2 floor plan 3 and a 1/2 steps and 1 rails to enter. Pt has half bath and a bed on 1st floor. Prior to admission pt walked with 2 canes     Initial Evaluation  Date: 3/16/18 Treatment      Short Term/ Long Term   Goals   Was pt agreeable to Eval/treatment? yes yes    Does pt have pain? No c/o pain at rest, but c/o posterior L knee pain with amb C/o L knee pain /10    Bed Mobility  NA, pt was found and left sitting up in chair Rolling: Independent   Supine to sit: supervision  Sit to supine: supervision  Scooting: supervision Independent   Transfers Sit to stand: MIN A  Stand to sit: MIN A  Stand pivot: MIN A with ww Sit to stand: MIN A from low chair, supervision from bed  Stand to sit: supervision  Stand pivot: supervision with ww Independent   Ambulation    150 feet with ww with SBA/MIN A 100 feet x 2 reps with ww supervision 300 feet with ww Independent   Stair negotiation: ascended and descended NA NA 4 steps with 1 rail Independent   AM-PAC Raw Score        Pt is alert and Oriented x3  BLE ROM is WFL, except L knee is 5-90°. LLE strength is grossly hip 3-/5 to 4/5, knee NA, and ankle 4/5 and RLE is grossly 4/5. Balance: standing with ww SBA    Pt performed therapeutic exercise of the following: pt was instructed in/performed exercises with the LLE: ankle PF/DF with bed sheet and calf on stool x 15 reps, seated heel slides and quad sets with bed sheet AAROM and foot on stool x15 reps, and seated knee FLEX/EXT sitting in chair with towel on ground x 15 reps.  Sit<>stands 2 reps    Patient education  Pt was educated on exercises as described above. Patient response to education:   Pt verbalized understanding Pt demonstrated skill Pt requires further education in this area   yes yes yes     Additional Comments: Pt moving better this afternoon and had no episode of unsteady gait or L knee bucking. She reported decreased pain in L knee after exercises and walking. Pt was left supine in bed with call light left by patient and her  present. Time in: 13:45  Time out: 14:15    Pt is making good progress toward established Physical Therapy goals. Continue with physical therapy current plan of care. Jaymie Buckner., P.T.   License Number: [id number]

## 2018-03-17 VITALS
HEIGHT: 67 IN | RESPIRATION RATE: 16 BRPM | TEMPERATURE: 98.4 F | WEIGHT: 265 LBS | SYSTOLIC BLOOD PRESSURE: 124 MMHG | OXYGEN SATURATION: 95 % | DIASTOLIC BLOOD PRESSURE: 57 MMHG | BODY MASS INDEX: 41.59 KG/M2 | HEART RATE: 94 BPM

## 2018-03-17 LAB
ANION GAP SERPL CALCULATED.3IONS-SCNC: 11 MMOL/L (ref 7–16)
BUN BLDV-MCNC: 21 MG/DL (ref 8–23)
CALCIUM SERPL-MCNC: 8.9 MG/DL (ref 8.6–10.2)
CHLORIDE BLD-SCNC: 102 MMOL/L (ref 98–107)
CO2: 26 MMOL/L (ref 22–29)
CREAT SERPL-MCNC: 0.9 MG/DL (ref 0.5–1)
GFR AFRICAN AMERICAN: >60
GFR NON-AFRICAN AMERICAN: >60 ML/MIN/1.73
GLUCOSE BLD-MCNC: 104 MG/DL (ref 74–109)
HCT VFR BLD CALC: 36.3 % (ref 34–48)
HEMOGLOBIN: 11 G/DL (ref 11.5–15.5)
MCH RBC QN AUTO: 27.4 PG (ref 26–35)
MCHC RBC AUTO-ENTMCNC: 30.3 % (ref 32–34.5)
MCV RBC AUTO: 90.5 FL (ref 80–99.9)
PDW BLD-RTO: 15.3 FL (ref 11.5–15)
PLATELET # BLD: 205 E9/L (ref 130–450)
PMV BLD AUTO: 12.9 FL (ref 7–12)
POTASSIUM REFLEX MAGNESIUM: 4.3 MMOL/L (ref 3.5–5)
RBC # BLD: 4.01 E12/L (ref 3.5–5.5)
SODIUM BLD-SCNC: 139 MMOL/L (ref 132–146)
WBC # BLD: 11.6 E9/L (ref 4.5–11.5)

## 2018-03-17 PROCEDURE — 36415 COLL VENOUS BLD VENIPUNCTURE: CPT

## 2018-03-17 PROCEDURE — 2580000003 HC RX 258: Performed by: ORTHOPAEDIC SURGERY

## 2018-03-17 PROCEDURE — 97116 GAIT TRAINING THERAPY: CPT

## 2018-03-17 PROCEDURE — 80048 BASIC METABOLIC PNL TOTAL CA: CPT

## 2018-03-17 PROCEDURE — 85027 COMPLETE CBC AUTOMATED: CPT

## 2018-03-17 PROCEDURE — 97112 NEUROMUSCULAR REEDUCATION: CPT

## 2018-03-17 PROCEDURE — 6370000000 HC RX 637 (ALT 250 FOR IP): Performed by: ORTHOPAEDIC SURGERY

## 2018-03-17 PROCEDURE — 97110 THERAPEUTIC EXERCISES: CPT

## 2018-03-17 PROCEDURE — 97530 THERAPEUTIC ACTIVITIES: CPT

## 2018-03-17 RX ORDER — SENNA AND DOCUSATE SODIUM 50; 8.6 MG/1; MG/1
2 TABLET, FILM COATED ORAL NIGHTLY
Qty: 60 TABLET | Refills: 0 | Status: SHIPPED | OUTPATIENT
Start: 2018-03-17 | End: 2018-04-16

## 2018-03-17 RX ORDER — OXYCODONE HYDROCHLORIDE AND ACETAMINOPHEN 5; 325 MG/1; MG/1
1 TABLET ORAL EVERY 4 HOURS PRN
Qty: 60 TABLET | Refills: 0 | Status: SHIPPED | OUTPATIENT
Start: 2018-03-17 | End: 2018-03-24

## 2018-03-17 RX ADMIN — OXYCODONE HYDROCHLORIDE AND ACETAMINOPHEN 2 TABLET: 5; 325 TABLET ORAL at 03:04

## 2018-03-17 RX ADMIN — OXYCODONE HYDROCHLORIDE AND ACETAMINOPHEN 2 TABLET: 5; 325 TABLET ORAL at 07:12

## 2018-03-17 RX ADMIN — METOPROLOL SUCCINATE 50 MG: 50 TABLET, FILM COATED, EXTENDED RELEASE ORAL at 09:02

## 2018-03-17 RX ADMIN — Medication 10 ML: at 09:02

## 2018-03-17 RX ADMIN — FAMOTIDINE 20 MG: 20 TABLET, FILM COATED ORAL at 09:02

## 2018-03-17 RX ADMIN — DOCUSATE SODIUM 100 MG: 100 CAPSULE, LIQUID FILLED ORAL at 09:02

## 2018-03-17 RX ADMIN — OXYCODONE HYDROCHLORIDE AND ACETAMINOPHEN 2 TABLET: 5; 325 TABLET ORAL at 11:32

## 2018-03-17 RX ADMIN — LEVOTHYROXINE SODIUM 150 MCG: 150 TABLET ORAL at 07:12

## 2018-03-17 ASSESSMENT — PAIN SCALES - GENERAL
PAINLEVEL_OUTOF10: 10
PAINLEVEL_OUTOF10: 0
PAINLEVEL_OUTOF10: 5

## 2018-03-17 ASSESSMENT — PAIN DESCRIPTION - ORIENTATION
ORIENTATION: LEFT
ORIENTATION: LEFT

## 2018-03-17 ASSESSMENT — PAIN DESCRIPTION - LOCATION
LOCATION: KNEE
LOCATION: KNEE

## 2018-03-17 ASSESSMENT — PAIN DESCRIPTION - DESCRIPTORS
DESCRIPTORS: ACHING;DISCOMFORT
DESCRIPTORS: ACHING;SORE

## 2018-03-17 ASSESSMENT — PAIN DESCRIPTION - PAIN TYPE
TYPE: SURGICAL PAIN
TYPE: NEUROPATHIC PAIN

## 2018-03-17 ASSESSMENT — PAIN DESCRIPTION - ONSET: ONSET: ON-GOING

## 2018-03-17 ASSESSMENT — PAIN DESCRIPTION - PROGRESSION: CLINICAL_PROGRESSION: NOT CHANGED

## 2018-03-17 ASSESSMENT — PAIN DESCRIPTION - FREQUENCY: FREQUENCY: CONTINUOUS

## 2018-03-17 NOTE — PROGRESS NOTES
Physical Therapy    S: In bed on arrival stating pain is much more severe and wants to limit todays therapy to prevent a lot pain when home. Has hsb at home to help but limited. Had RT knee done 4 mo. Ago. O: bed mobility: Indep  Transfers: Indep/S  Amb: [employer] indep 250+'  Steps x 4 S/Indep with HR use (limited RT knee flexion to advance good LE up/prior TKA)  There-ex: LTLE    A: General deconditioning with limited mobility tolerances combined with LT knee pain preventing increased mobility distances. Gait pattern stable but slow paced due to pain and general deconditioning. No drifting, buckle or LOB with WW use. Steps were more difficult due to prior RT knee TKA with limitations with MMT and ROM preventing safe step advance up. Was able to get RTLE up but was a safety issue and discussed with patient perform steps slow to prevent fall. Limited there-ex tolerances this AM due to pain.       P: D/c planned today  [facility] 20/24

## 2018-03-17 NOTE — OP NOTE
[address] [address]                                 OPERATIVE REPORT    PATIENT NAME: Samia Patten                       :        1945  MED REC NO:   [id number]                            ROOM:       5530  ACCOUNT NO:   [de-identified]                           ADMIT DATE: 03/15/2018  PROVIDER:     Presley Abdi MD    DATE OF PROCEDURE:  03/15/2018    PREOPERATIVE DIAGNOSIS:  Severe left knee osteoarthritis with varus  deformity. POSTOPERATIVE DIAGNOSIS:  Severe left knee osteoarthritis with varus  deformity. PROCEDURE PERFORMED:  Left total knee arthroplasty. SURGEON:  Presley Abdi MD.    ASSISTANT:  [facility]. No qualified resident available. ANESTHESIA:  General plus adductor canal block. ESTIMATED BLOOD LOSS:  50 mL. SPECIMEN:  Bone cuts, left knee. DRAINS:  One-eighth-inch Hemovac. COMPLICATIONS:  None. CONDITION:  Stable to recovery room. IMPLANTS:  1. Biomet Vanguard size 65 left PS femur with a 75-mm I-beam tibia, 34-mm  asymmetric patella, and a 10-mm standard PS articular bearing. 2.  [place]acos with gentamicin bone cement x1 package. INDICATION FOR PROCEDURE:  The patient is a 29-year-old woman with severe  left knee pain secondary to bone-on-bone osteoarthritis, unresponsive to  conservative treatment. The risks, benefits, alternatives, and limitations  to elective knee arthroplasty were discussed and informed consent obtained. DESCRIPTION OF PROCEDURE:  The patient was met in the preop holding area. The left knee was marked as the correct operative site. Peripheral nerve  block was given by the Anesthesia Department, taken to the operating room  where spinal anesthesia was attempted, but was unsuccessful. She was then  given a general anesthetic. Owen catheter was placed. Intravenous  antibiotics were given prophylactically.   Tourniquet was placed in

## 2019-06-10 ENCOUNTER — HOSPITAL ENCOUNTER (OUTPATIENT)
Dept: NEUROLOGY | Age: 74
Discharge: HOME | End: 2019-06-10
Payer: MEDICARE

## 2019-06-10 VITALS — BODY MASS INDEX: 42.38 KG/M2 | WEIGHT: 270 LBS | HEIGHT: 67 IN

## 2019-06-10 PROCEDURE — 95911 NRV CNDJ TEST 9-10 STUDIES: CPT

## 2019-06-10 PROCEDURE — 95886 MUSC TEST DONE W/N TEST COMP: CPT

## 2019-06-10 NOTE — LETTER
RE:  Lorne Dominguez    Dear Dr. Maribell Yee,     Enclosed you will find a copy of the requested EMG on your patient, Lorne Dominguez completed 6/10/2019. EMG Findings:     Marcelle Romano MD   Physician   Internal Medicine   Procedures   Signed   Date of Service:  6/10/2019  3:33 PM            Procedure Orders   EMG [[id number]] ordered by Marcelle Romano MD at 61/42/45 1533   Procedures   EMG [NEU5]          Signed                 Show:Clear all  [x]Manual[x]Template[x]Copied    Added by:  Param Tidwell MD      []Singhver for details       [address] [place] [address]   Electrodiagnostic Laboratory  [place]         Full Name:    Lorne Dominguez                 Gender:             Female  MRN:             [id number]                       YOB: 1945  Location[de-identified]     Outpt. Visit Date:                          6/10/2019 13:18  Age:                                   69 Years 4 Months Old  Examining Physician:      Dr. Ashley Valles  Referring Physician:        Dr. Michael Carballo  Technician:                       Mona Huynh   Height:                               5 feet 7 inch  Weight:                              270 lbs  Notes:                                G83. 20      Motor NCS      Nerve / Sites Lat. Amplitude Distance Lat Diff Velocity Temp. Amp. 1-2     ms mV cm ms m/s °C %   R Median - APB      Wrist 9.95 1.1 8     33.5 100      Elbow 15.73 0.9 23 5.78 40 33.6 82.2   L Median - APB      Wrist 8.18 2.2 8     32.9 100      Elbow 13.18 1.9 23 5.00 46 32.7 82.9   R Ulnar - ADM      Wrist 2.76 8.6 8     33.5 100      B. Elbow 6.04 7.8 18 3.28 55 33.4 90.3      A. Elbow 7.66 7.3 10 1.61 62 33.4 84.6   L Ulnar - ADM      Wrist 2.66 6.1 8     32.9 100      B. Elbow 6.15 5.2 18 3.49 52 32.9 84.3      A. Elbow 7.71 4.9 10 1.56 64 32.9 80.2       Sensory NCS      Nerve / Sites Onset Lat Peak Lat PP Amp Distance Velocity Temp.     ms ms µV cm m/s °C L Median - Digit II (Antidromic)      Mid Palm 1.98 2.81 7.2 7 35 32.6      Wrist 5.31 6.41 7.0 14 26 32.6   R Median - Digit II (Antidromic)      Mid Palm NR NR NR 7 NR 33      Wrist NR NR NR 14 NR 33.1   L Ulnar - Digit V (Antidromic)      Wrist 3.13 3.65 6.9 14 45 32.5   R Ulnar - Digit V (Antidromic)      Wrist 2.71 3.39 5.6 14 52 32.9   R Radial - Anatomical snuff box (Forearm)      Forearm 1.88 2.50 11.8 10 53 33   L Radial - Anatomical snuff box (Forearm)      Forearm 1.72 2.19 13.2 10 58 32.9       F  Wave      Nerve F Lat M Lat F-M Lat     ms ms ms   R Median - APB 43.9 9.0 34.9   R Ulnar - ADM 29.1 2.3 26.8   L Median - APB 37.8 8.4 29.4   L Ulnar - ADM 29.4 3.0 26.4       EMG                     EMG Summary Table       Spontaneous MUAP Recruitment   Muscle IA Fib PSW Fasc H.F. Amp Dur. PPP Pattern   R. Triceps brachii N None None None None N N N N   L. Triceps brachii N None None None None N N N N   R. Biceps brachii N None None None None N N N N   L. Biceps brachii N None None None None N N N N   R. Extensor digitorum communis N None None None None N N N N   L. Extensor digitorum communis N None None None None N N N N   R. Pronator teres N None None None None N N N N   L. Pronator teres N None None None None N N N N   R. Flexor carpi ulnaris N None None None None N N N N   L. Flexor carpi ulnaris N None None None None N N N N   R. Flexor digitorum profundus  N None None None None N N N N   L. Flexor digitorum profundus  N None None None None N N N N   R. Extensor pollicis longus N None None None None N N N N   L. Extensor pollicis longus N None None None None N N N N   R. First dorsal interosseous N None None None None N N N N   L. First dorsal interosseous N None None None None N N N N   R. Abductor pollicis brevis N 1+ 1+ None +1 Serrated 2+ 2+ 1+ Markedly Dec Number   L.  Abductor pollicis brevis N 1+ 1+ None 1+ Serrated 2+ 2+ 1+ Markedly Dec Number         This is the first electrodiagnostic study for Jonah Beltrán. She voices consent. There are no contraindications.     Summary:  Nerve conduction studies in the upper extremities revealed prolonged mixed motor latency of the median nerve at the wrist bilaterally. Bluetooth were also markedly reduced particularly right median nerve. Velocities were slowed. Temperature measured as normal.     Ulnar studies demonstrated normal mixed motor latency, normal amplitude and velocities bilaterally. .     Sensory studies in the upper extremities demonstrate no response to stimulation of the right median nerve, antidromic technique. Left median antidromic stimulation of digit II demonstrated prolonged peak latency with reasonable amplitude. Ulnar and radial sensory stimulations were within accepted limits of normal..     F waves of the median nerve prolonged bilaterally. Normal F wave or nerves bilaterally.     Insertional activity in the upper extremity revealing positive waves, for ablation potentials, serrated potentials, and polyphasic waves with increased amplitude and duration and decreased number on maximal recruitment found in the abductor pollicis brevis bilaterally. Please refer to the chart for specific muscles tested as well as there results. .        Impression:    #1 Bilateral Carpal Tunnel. Syndromes. Severe category.     #2 normal conduction studies of the ulnar and radial nerves bilaterally.     #3 no diagnostic evidence of a cervical motor radiculopathy. Pure sensory radiculopathies cannot be detected by electrodiagnostic technique.     Clinical correlation recommended. Thank you.        Electronically signed by Carlton Cee MD on 5/59/0497 at 3:33 PM                       History:  Jonah Beltrán complains of \"several years\" progressive numbness in digits 1 2 and 3 of both hands, worse on the right. She is right dominant.   She complains of intermittent neck discomfort however no radiation. She also complains of weakness in her , and recently has had difficulty holding \"things\". ROS:   See the above history. She dates that she is hypothyroid, has a history of atrial fibrillation with a rapid ventricular response. She also has high blood pressure, denies any lung disorders. Does have osteoarthritis and has had total knee replacements. She complains of decreased mobility. Meds:    Prior to Admission medications    Medication Sig Start Date End Date Taking? Authorizing Provider   rivaroxaban (XARELTO) 20 MG TABS tablet Take 20 mg by mouth Daily. Historical Provider, MD   metoprolol (TOPROL-XL) 50 MG XL tablet Take 50 mg by mouth 2 times daily Instructed to take am of procedure    Historical Provider, MD   levothyroxine (SYNTHROID) 150 MCG tablet Take 1 tablet by mouth daily.  2/14/13   Christi Yuen MD       PMH:     Past Medical History:   Diagnosis Date    [facility]     GERD (gastroesophageal reflux disease)     Hashimoto's disease     Hypertension     Osteoarthritis, knee     bilateral    PVD (peripheral vascular disease) [facility])        PSH:    Past Surgical History:   Procedure Laterality Date    FOOT TENDON SURGERY Left     KNEE ARTHROPLASTY Right 11/07/2017    total    [facility] TOTAL KNEE ARTHROPLASTY Left 3/15/2018    LEFT TOTAL JOINT ARTHROPLASTY   KNEE (BIOMET)  --PNB-- performed by [facility]ia [facility] Left 03/15/2018       Social History:    Social History     Socioeconomic History    Marital status:      Spouse name: Not on file    Number of children: Not on file    Years of education: Not on file    Highest education level: Not on file   Occupational History    Not on file   Social Needs    Financial resource strain: Not on file    Food insecurity:     Worry: Not on file     Inability: Not on file    Transportation needs:     Medical: Not on file     Non-medical: Not on file   Tobacco Use  Smoking status: Former Smoker     Last attempt to quit: 2001     Years since quittin.9    Smokeless tobacco: Never Used   Substance and Sexual Activity    Alcohol use: No    Drug use: No    Sexual activity: Not on file   Lifestyle    Physical activity:     Days per week: Not on file     Minutes per session: Not on file    Stress: Not on file   Relationships    Social connections:     Talks on phone: Not on file     Gets together: Not on file     Attends Bahai service: Not on file     Active member of club or organization: Not on file     Attends meetings of clubs or organizations: Not on file     Relationship status: Not on file    Intimate partner violence:     Fear of current or ex partner: Not on file     Emotionally abused: Not on file     Physically abused: Not on file     Forced sexual activity: Not on file   Other Topics Concern    Not on file   Social History Narrative    Not on file       Family History:    Family History   Problem Relation Age of Onset    Diabetes Mother     Cancer Mother     Thyroid Disease Mother     Heart Failure Father        Exam: Exam reveals a 72-year-old female 5 foot 7 inches weighing 270 pounds. Cognitively intact. Follows commands. Skin: In the upper extremities reveals no lesions, rashes, abnormal jewels. Temperature, color, and texture are all within except limits of normal for age. Motor examination of the upper extremities reveals decreased opposition, some decrease in intrinsic strength. Atrophy noted in the thenar area laterally. .  Tone is felt to be normal.    Sensory examination feels decrease in the thenar median nerve distribution bilaterally. Remainder of the exam is normal..     Reflexes are symmetrical in the upper extremities. No pathologic reflexes. .   Cervical range of motion demonstrates full flexion, extension to 40 degrees, lateral rotation to 50 degrees without complaint of pain.

## 2019-06-10 NOTE — PROCEDURES
[facility]   Electrodiagnostic Laboratory  [place]        Full Name: Malia Donis Gender: Female  MRN: [id number] YOB: 1945  Location[de-identified] Outpt. Visit Date: 6/10/2019 13:18  Age: 69 Years 4 Months Old  Examining Physician: Dr. Tariq Roque  Referring Physician: Dr. Minh Fernandez  Technician: Vonda Cruz   Height: 5 feet 7 inch  Weight: 270 lbs  Notes: G83.20      Motor NCS      Nerve / Sites Lat. Amplitude Distance Lat Diff Velocity Temp. Amp. 1-2    ms mV cm ms m/s °C %   R Median - APB      Wrist 9.95 1.1 8   33.5 100      Elbow 15.73 0.9 23 5.78 40 33.6 82.2   L Median - APB      Wrist 8.18 2.2 8   32.9 100      Elbow 13.18 1.9 23 5.00 46 32.7 82.9   R Ulnar - ADM      Wrist 2.76 8.6 8   33.5 100      B. Elbow 6.04 7.8 18 3.28 55 33.4 90.3      A. Elbow 7.66 7.3 10 1.61 62 33.4 84.6   L Ulnar - ADM      Wrist 2.66 6.1 8   32.9 100      B. Elbow 6.15 5.2 18 3.49 52 32.9 84.3      A. Elbow 7.71 4.9 10 1.56 64 32.9 80.2       Sensory NCS      Nerve / Sites Onset Lat Peak Lat PP Amp Distance Velocity Temp.    ms ms µV cm m/s °C   L Median - Digit II (Antidromic)      Mid Palm 1.98 2.81 7.2 7 35 32.6      Wrist 5.31 6.41 7.0 14 26 32.6   R Median - Digit II (Antidromic)      Mid Palm NR NR NR 7 NR 33      Wrist NR NR NR 14 NR 33.1   L Ulnar - Digit V (Antidromic)      Wrist 3.13 3.65 6.9 14 45 32.5   R Ulnar - Digit V (Antidromic)      Wrist 2.71 3.39 5.6 14 52 32.9   R Radial - Anatomical snuff box (Forearm)      Forearm 1.88 2.50 11.8 10 53 33   L Radial - Anatomical snuff box (Forearm)      Forearm 1.72 2.19 13.2 10 58 32.9       F  Wave      Nerve F Lat M Lat F-M Lat    ms ms ms   R Median - APB 43.9 9.0 34.9   R Ulnar - ADM 29.1 2.3 26.8   L Median - APB 37.8 8.4 29.4   L Ulnar - ADM 29.4 3.0 26.4       EMG         EMG Summary Table     Spontaneous MUAP Recruitment   Muscle IA Fib PSW Fasc H.F. Amp Dur. PPP Pattern   R.  Triceps brachii N None None None None N N N N   L. Triceps brachii N None None None None N N N N   R. Biceps brachii N None None None None N N N N   L. Biceps brachii N None None None None N N N N   R. Extensor digitorum communis N None None None None N N N N   L. Extensor digitorum communis N None None None None N N N N   R. Pronator teres N None None None None N N N N   L. Pronator teres N None None None None N N N N   R. Flexor carpi ulnaris N None None None None N N N N   L. Flexor carpi ulnaris N None None None None N N N N   R. Flexor digitorum profundus  N None None None None N N N N   L. Flexor digitorum profundus  N None None None None N N N N   R. Extensor pollicis longus N None None None None N N N N   L. Extensor pollicis longus N None None None None N N N N   R. First dorsal interosseous N None None None None N N N N   L. First dorsal interosseous N None None None None N N N N   R. Abductor pollicis brevis N 1+ 1+ None +1 Serrated 2+ 2+ 1+ Markedly Dec Number   L. Abductor pollicis brevis N 1+ 1+ None 1+ Serrated 2+ 2+ 1+ Markedly Dec Number         This is the first electrodiagnostic study for [facility]. She voices consent. There are no contraindications. Summary:  Nerve conduction studies in the upper extremities revealed prolonged mixed motor latency of the median nerve at the wrist bilaterally. Bluetooth were also markedly reduced particularly right median nerve. Velocities were slowed. Temperature measured as normal.    Ulnar studies demonstrated normal mixed motor latency, normal amplitude and velocities bilaterally. .    Sensory studies in the upper extremities demonstrate no response to stimulation of the right median nerve, antidromic technique. Left median antidromic stimulation of digit II demonstrated prolonged peak latency with reasonable amplitude. Ulnar and radial sensory stimulations were within accepted limits of normal..    F waves of the median nerve prolonged bilaterally.   Normal F wave or nerves

## 2021-02-15 ENCOUNTER — HOSPITAL ENCOUNTER (OUTPATIENT)
Age: 76
Setting detail: OBSERVATION
Discharge: HOME | End: 2021-02-16
Attending: EMERGENCY MEDICINE | Admitting: INTERNAL MEDICINE
Payer: MEDICARE

## 2021-02-15 DIAGNOSIS — N17.9 AKI (ACUTE KIDNEY INJURY) (HCC): ICD-10-CM

## 2021-02-15 DIAGNOSIS — I83.899 BLEEDING FROM VARICOSE VEIN: Primary | ICD-10-CM

## 2021-02-15 DIAGNOSIS — R73.9 HYPERGLYCEMIA: ICD-10-CM

## 2021-02-15 DIAGNOSIS — D62 ACUTE BLOOD LOSS ANEMIA: ICD-10-CM

## 2021-02-15 PROBLEM — I48.91 ATRIAL FIBRILLATION (HCC): Status: ACTIVE | Noted: 2021-02-15

## 2021-02-15 PROBLEM — I83.891 BLEEDING FROM VARICOSE VEINS OF RIGHT LOWER EXTREMITY: Status: ACTIVE | Noted: 2021-02-15

## 2021-02-15 PROBLEM — Z86.79 HISTORY OF HYPERTENSION: Status: ACTIVE | Noted: 2021-02-15

## 2021-02-15 PROBLEM — I95.9 HYPOTENSION: Status: ACTIVE | Noted: 2021-02-15

## 2021-02-15 LAB
ABO/RH: NORMAL
ANION GAP SERPL CALCULATED.3IONS-SCNC: 11 MMOL/L (ref 7–16)
ANTIBODY SCREEN: NORMAL
APTT: 27.3 SEC (ref 24.5–35.1)
BASOPHILS ABSOLUTE: 0.06 E9/L (ref 0–0.2)
BASOPHILS RELATIVE PERCENT: 1.2 % (ref 0–2)
BLOOD BANK DISPENSE STATUS: NORMAL
BLOOD BANK DISPENSE STATUS: NORMAL
BLOOD BANK PRODUCT CODE: NORMAL
BLOOD BANK PRODUCT CODE: NORMAL
BPU ID: NORMAL
BPU ID: NORMAL
BUN BLDV-MCNC: 21 MG/DL (ref 8–23)
CALCIUM SERPL-MCNC: 8.3 MG/DL (ref 8.6–10.2)
CHLORIDE BLD-SCNC: 105 MMOL/L (ref 98–107)
CO2: 20 MMOL/L (ref 22–29)
CREAT SERPL-MCNC: 1.3 MG/DL (ref 0.5–1)
DESCRIPTION BLOOD BANK: NORMAL
DESCRIPTION BLOOD BANK: NORMAL
EOSINOPHILS ABSOLUTE: 0.09 E9/L (ref 0.05–0.5)
EOSINOPHILS RELATIVE PERCENT: 1.7 % (ref 0–6)
GFR AFRICAN AMERICAN: 48
GFR NON-AFRICAN AMERICAN: 40 ML/MIN/1.73
GLUCOSE BLD-MCNC: 265 MG/DL (ref 74–99)
HCT VFR BLD CALC: 32.3 % (ref 34–48)
HCT VFR BLD CALC: 32.7 % (ref 34–48)
HEMOGLOBIN: 10.4 G/DL (ref 11.5–15.5)
HEMOGLOBIN: 9.9 G/DL (ref 11.5–15.5)
IMMATURE GRANULOCYTES #: 0.03 E9/L
IMMATURE GRANULOCYTES %: 0.6 % (ref 0–5)
INR BLD: 1.3
LYMPHOCYTES ABSOLUTE: 1.73 E9/L (ref 1.5–4)
LYMPHOCYTES RELATIVE PERCENT: 33.3 % (ref 20–42)
MCH RBC QN AUTO: 28.9 PG (ref 26–35)
MCHC RBC AUTO-ENTMCNC: 30.3 % (ref 32–34.5)
MCV RBC AUTO: 95.3 FL (ref 80–99.9)
METER GLUCOSE: 149 MG/DL (ref 74–99)
MONOCYTES ABSOLUTE: 0.55 E9/L (ref 0.1–0.95)
MONOCYTES RELATIVE PERCENT: 10.6 % (ref 2–12)
NEUTROPHILS ABSOLUTE: 2.74 E9/L (ref 1.8–7.3)
NEUTROPHILS RELATIVE PERCENT: 52.6 % (ref 43–80)
PDW BLD-RTO: 14.5 FL (ref 11.5–15)
PLATELET # BLD: 187 E9/L (ref 130–450)
PMV BLD AUTO: 12.5 FL (ref 7–12)
POTASSIUM REFLEX MAGNESIUM: 4.2 MMOL/L (ref 3.5–5)
PROTHROMBIN TIME: 15.3 SEC (ref 9.3–12.4)
RBC # BLD: 3.43 E12/L (ref 3.5–5.5)
SODIUM BLD-SCNC: 136 MMOL/L (ref 132–146)
TROPONIN: <0.01 NG/ML (ref 0–0.03)
WBC # BLD: 5.2 E9/L (ref 4.5–11.5)

## 2021-02-15 PROCEDURE — 36430 TRANSFUSION BLD/BLD COMPNT: CPT

## 2021-02-15 PROCEDURE — 86920 COMPATIBILITY TEST SPIN: CPT

## 2021-02-15 PROCEDURE — 85014 HEMATOCRIT: CPT

## 2021-02-15 PROCEDURE — G0378 HOSPITAL OBSERVATION PER HR: HCPCS

## 2021-02-15 PROCEDURE — 36415 COLL VENOUS BLD VENIPUNCTURE: CPT

## 2021-02-15 PROCEDURE — 6360000002 HC RX W HCPCS

## 2021-02-15 PROCEDURE — 85610 PROTHROMBIN TIME: CPT

## 2021-02-15 PROCEDURE — 2580000003 HC RX 258: Performed by: NURSE PRACTITIONER

## 2021-02-15 PROCEDURE — 86901 BLOOD TYPING SEROLOGIC RH(D): CPT

## 2021-02-15 PROCEDURE — 84484 ASSAY OF TROPONIN QUANT: CPT

## 2021-02-15 PROCEDURE — 80048 BASIC METABOLIC PNL TOTAL CA: CPT

## 2021-02-15 PROCEDURE — 99285 EMERGENCY DEPT VISIT HI MDM: CPT

## 2021-02-15 PROCEDURE — 96374 THER/PROPH/DIAG INJ IV PUSH: CPT

## 2021-02-15 PROCEDURE — 82962 GLUCOSE BLOOD TEST: CPT

## 2021-02-15 PROCEDURE — 86850 RBC ANTIBODY SCREEN: CPT

## 2021-02-15 PROCEDURE — 85018 HEMOGLOBIN: CPT

## 2021-02-15 PROCEDURE — 85730 THROMBOPLASTIN TIME PARTIAL: CPT

## 2021-02-15 PROCEDURE — 2580000003 HC RX 258: Performed by: STUDENT IN AN ORGANIZED HEALTH CARE EDUCATION/TRAINING PROGRAM

## 2021-02-15 PROCEDURE — 96361 HYDRATE IV INFUSION ADD-ON: CPT

## 2021-02-15 PROCEDURE — 51702 INSERT TEMP BLADDER CATH: CPT

## 2021-02-15 PROCEDURE — 2500000003 HC RX 250 WO HCPCS

## 2021-02-15 PROCEDURE — P9016 RBC LEUKOCYTES REDUCED: HCPCS

## 2021-02-15 PROCEDURE — 85025 COMPLETE CBC W/AUTO DIFF WBC: CPT

## 2021-02-15 PROCEDURE — 86900 BLOOD TYPING SEROLOGIC ABO: CPT

## 2021-02-15 PROCEDURE — 6370000000 HC RX 637 (ALT 250 FOR IP): Performed by: NURSE PRACTITIONER

## 2021-02-15 RX ORDER — LORAZEPAM 1 MG/1
1 TABLET ORAL NIGHTLY PRN
Status: DISCONTINUED | OUTPATIENT
Start: 2021-02-15 | End: 2021-02-16 | Stop reason: HOSPADM

## 2021-02-15 RX ORDER — ACETAMINOPHEN 325 MG/1
650 TABLET ORAL EVERY 6 HOURS PRN
Status: DISCONTINUED | OUTPATIENT
Start: 2021-02-15 | End: 2021-02-16 | Stop reason: HOSPADM

## 2021-02-15 RX ORDER — SODIUM CHLORIDE 0.9 % (FLUSH) 0.9 %
10 SYRINGE (ML) INJECTION PRN
Status: DISCONTINUED | OUTPATIENT
Start: 2021-02-15 | End: 2021-02-16 | Stop reason: HOSPADM

## 2021-02-15 RX ORDER — SODIUM CHLORIDE 9 MG/ML
INJECTION, SOLUTION INTRAVENOUS PRN
Status: DISCONTINUED | OUTPATIENT
Start: 2021-02-15 | End: 2021-02-16 | Stop reason: HOSPADM

## 2021-02-15 RX ORDER — SODIUM CHLORIDE 0.9 % (FLUSH) 0.9 %
10 SYRINGE (ML) INJECTION EVERY 12 HOURS SCHEDULED
Status: DISCONTINUED | OUTPATIENT
Start: 2021-02-15 | End: 2021-02-16 | Stop reason: HOSPADM

## 2021-02-15 RX ORDER — SODIUM CHLORIDE 9 MG/ML
INJECTION, SOLUTION INTRAVENOUS CONTINUOUS
Status: DISCONTINUED | OUTPATIENT
Start: 2021-02-15 | End: 2021-02-16

## 2021-02-15 RX ORDER — LORAZEPAM 1 MG/1
1 TABLET ORAL NIGHTLY
COMMUNITY

## 2021-02-15 RX ORDER — 0.9 % SODIUM CHLORIDE 0.9 %
1000 INTRAVENOUS SOLUTION INTRAVENOUS ONCE
Status: COMPLETED | OUTPATIENT
Start: 2021-02-15 | End: 2021-02-15

## 2021-02-15 RX ORDER — LIDOCAINE HYDROCHLORIDE AND EPINEPHRINE 10; 10 MG/ML; UG/ML
INJECTION, SOLUTION INFILTRATION; PERINEURAL
Status: COMPLETED
Start: 2021-02-15 | End: 2021-02-15

## 2021-02-15 RX ORDER — METOPROLOL SUCCINATE 50 MG/1
50 TABLET, EXTENDED RELEASE ORAL 2 TIMES DAILY
Status: DISCONTINUED | OUTPATIENT
Start: 2021-02-15 | End: 2021-02-16 | Stop reason: HOSPADM

## 2021-02-15 RX ORDER — ACETAMINOPHEN 650 MG/1
650 SUPPOSITORY RECTAL EVERY 6 HOURS PRN
Status: DISCONTINUED | OUTPATIENT
Start: 2021-02-15 | End: 2021-02-16 | Stop reason: HOSPADM

## 2021-02-15 RX ORDER — LORAZEPAM 0.5 MG/1
0.5 TABLET ORAL EVERY 6 HOURS PRN
COMMUNITY
End: 2021-02-15

## 2021-02-15 RX ORDER — LEVOTHYROXINE SODIUM 0.07 MG/1
150 TABLET ORAL DAILY
Status: DISCONTINUED | OUTPATIENT
Start: 2021-02-16 | End: 2021-02-16 | Stop reason: HOSPADM

## 2021-02-15 RX ORDER — POLYETHYLENE GLYCOL 3350 17 G/17G
17 POWDER, FOR SOLUTION ORAL DAILY PRN
Status: DISCONTINUED | OUTPATIENT
Start: 2021-02-15 | End: 2021-02-16 | Stop reason: HOSPADM

## 2021-02-15 RX ORDER — FENTANYL CITRATE 50 UG/ML
INJECTION, SOLUTION INTRAMUSCULAR; INTRAVENOUS
Status: COMPLETED
Start: 2021-02-15 | End: 2021-02-15

## 2021-02-15 RX ADMIN — SODIUM CHLORIDE: 9 INJECTION, SOLUTION INTRAVENOUS at 17:48

## 2021-02-15 RX ADMIN — METOPROLOL SUCCINATE 50 MG: 50 TABLET, EXTENDED RELEASE ORAL at 20:29

## 2021-02-15 RX ADMIN — ACETAMINOPHEN 650 MG: 325 TABLET ORAL at 18:16

## 2021-02-15 RX ADMIN — SODIUM CHLORIDE 1000 ML: 9 INJECTION, SOLUTION INTRAVENOUS at 12:15

## 2021-02-15 RX ADMIN — LORAZEPAM 1 MG: 1 TABLET ORAL at 23:39

## 2021-02-15 RX ADMIN — FENTANYL CITRATE 50 MCG: 50 INJECTION, SOLUTION INTRAMUSCULAR; INTRAVENOUS at 12:38

## 2021-02-15 RX ADMIN — LIDOCAINE HYDROCHLORIDE,EPINEPHRINE BITARTRATE: 10; .01 INJECTION, SOLUTION INFILTRATION; PERINEURAL at 12:57

## 2021-02-15 ASSESSMENT — PAIN DESCRIPTION - ORIENTATION: ORIENTATION: RIGHT

## 2021-02-15 ASSESSMENT — PAIN DESCRIPTION - LOCATION: LOCATION: FOOT

## 2021-02-15 ASSESSMENT — PAIN DESCRIPTION - PAIN TYPE: TYPE: ACUTE PAIN

## 2021-02-15 NOTE — ED PROVIDER NOTES
[address], [facility] ENCOUNTER      Pt Name: Ramses Cowart  MRN: [id number]  [place] 1945  Date of evaluation: 2/15/2021      CHIEF COMPLAINT       Chief Complaint   Patient presents with    Varicose Veins     bleeding- top R foot        HPI  Ramses Cowart is a 78 y.o. female past medical history varicose veins on Xarelto presents with bleeding from her varicose vein on the right dorsum of the foot. Patient states that she started bleeding from her varicose veins hours prior to presentation. States that she attempted to stop the bleeding with pressure with no relief. States that she is lost a lot of blood. No alleviating or exacerbating factors. Review of systems not obtained from patient due to the acuity of condition. Except as noted above the remainder of the review of systems was reviewed and negative. Review of Systems   Unable to perform ROS: Acuity of condition        Physical Exam  Vitals signs and nursing note reviewed. Constitutional:       General: She is in acute distress. Appearance: Normal appearance. She is well-developed. She is obese. She is not ill-appearing or diaphoretic. HENT:      Head: Normocephalic and atraumatic. Eyes:      Pupils: Pupils are equal, round, and reactive to light. Neck:      Musculoskeletal: Normal range of motion. Cardiovascular:      Rate and Rhythm: Normal rate and regular rhythm. Pulses: Normal pulses. Heart sounds: Normal heart sounds. Pulmonary:      Effort: Pulmonary effort is normal. No respiratory distress. Breath sounds: Normal breath sounds. No stridor. No wheezing, rhonchi or rales. Chest:      Chest wall: No tenderness. Abdominal:      General: Bowel sounds are normal.      Palpations: Abdomen is soft. Tenderness: There is no abdominal tenderness. There is no guarding or rebound. Musculoskeletal:         General: No swelling, tenderness or signs of injury.       Right lower leg: No edema. Left lower leg: No edema. Skin:     General: Skin is warm and dry. Capillary Refill: Capillary refill takes 2 to 3 seconds. Coloration: Skin is pale. Comments: Right dorsum of foot has a brisk varicose vein bleed. Neurological:      General: No focal deficit present. Mental Status: She is alert and oriented to person, place, and time. Cranial Nerves: No cranial nerve deficit. Coordination: Coordination normal.      Comments: GCS 15.   Psychiatric:         Mood and Affect: Mood normal.          Procedures   PROCEDURE NOTE  2/16/21       Time:     LACERATION REPAIR  Risks, benefits and alternatives (for applicable procedures below) described. Performed By: Wing Arana MD.    Laceration #: 1. Location: dorsum of right foot  Length: 1cm. The wound area was cleansend with shur-clens and draped in a sterile fashion. Local Anesthesia:  not required. The wound was explored with the following results:  no foreign body or tendon injury seen. Debridement: partial thickness and None. Undermining: None. Wound Margins Revised: None. Flaps Aligned: no. The wound was closed with 5-0 Prolene using figure-of-eight sutures. Dressing:  a gauze dressing. There were no additional wounds requiring formal closure. Total number suture:  1. MDM  Number of Diagnoses or Management Options  Acute blood loss anemia  TOSHIA (acute kidney injury) ([facility] [address]  Bleeding from varicose vein  Hyperglycemia  Diagnosis management comments: 26-year-old female with a past medical history of CAD on Xarelto and diabetes, presents with acute varicose vein bleed. Vitals significant for tachycardia. She is afebrile saturating well on room air. On physical exam patient is lethargic, visibly anxious, uncomfortable. She is slow to answer questions and looks pale. EMS has placed occlusive dressing on wound to stop bleeding.  She has a brisk varicose vein bleed on the dorsum of the right foot.  Figure-of-eight suture was placed and bleeding resolved. Diagnostic labs reviewed. Significant for anemia from acute hemorrhage, TOSHIA, and hyperglycemia. Patient was given IV fluids, 2 units of trauma blood, and 1 unit of PRBCs on hold as patient was symptomatic from bleed. Amount and/or Complexity of Data Reviewed  Decide to obtain previous medical records or to obtain history from someone other than the patient: yes       ED Course as of Feb 16 1933   Mon Feb 15, 2021   1226 Actively bleeding varicose vein to right foot. Stitch placed at bedside with good control of bleeding. Patient hypotensive. Bps not responding to IVF. Will give O - blood. [JA]   1227   Hemostatsis PROCEDURE NOTE:  Unless otherwise indicated, this procedure was done or directly supervised by the ED attending. Location: varicose vein to right foot actively bleeding. Figure-of-eight stitch placed with 4.0 prolene with good hemostasis. [JA]   1307 BP improved. Second unit of PRBCs infusing. [JA]      ED Course User Index  [JA] Wilma Santa MD       --------------------------------------------- PAST HISTORY ---------------------------------------------  Past Medical History:  has a past medical history of A-fib ([facility] [address] GERD (gastroesophageal reflux disease), Hashimoto's disease, Hypertension, Osteoarthritis, knee, and PVD (peripheral vascular disease) ([facility] [address] Past Surgical History:  has a past surgical history that includes Foot Tendon Surgery (Left); Knee Arthroplasty (Right, 11/07/2017); Total knee arthroplasty (Left, 03/15/2018); and pr total knee arthroplasty (Left, 3/15/2018). Social History:  reports that she quit smoking about 19 years ago. She has never used smokeless tobacco. She reports that she does not drink alcohol or use drugs. Family History: family history includes Cancer in her mother; Diabetes in her mother; Heart Failure in her father; Kidney Disease in her sister;  Thyroid Disease in her mother. The patients home medications have been reviewed.     Allergies: Adhesive tape and Clindamycin/lincomycin    -------------------------------------------------- RESULTS -------------------------------------------------    LABS:  Results for orders placed or performed during the hospital encounter of 02/15/21   CBC Auto Differential   Result Value Ref Range    WBC 5.2 4.5 - 11.5 E9/L    RBC 3.43 (L) 3.50 - 5.50 E12/L    Hemoglobin 9.9 (L) 11.5 - 15.5 g/dL    Hematocrit 32.7 (L) 34.0 - 48.0 %    MCV 95.3 80.0 - 99.9 fL    MCH 28.9 26.0 - 35.0 pg    MCHC 30.3 (L) 32.0 - 34.5 %    RDW 14.5 11.5 - 15.0 fL    Platelets 266 889 - 405 E9/L    MPV 12.5 (H) 7.0 - 12.0 fL    Neutrophils % 52.6 43.0 - 80.0 %    Immature Granulocytes % 0.6 0.0 - 5.0 %    Lymphocytes % 33.3 20.0 - 42.0 %    Monocytes % 10.6 2.0 - 12.0 %    Eosinophils % 1.7 0.0 - 6.0 %    Basophils % 1.2 0.0 - 2.0 %    Neutrophils Absolute 2.74 1.80 - 7.30 E9/L    Immature Granulocytes # 0.03 E9/L    Lymphocytes Absolute 1.73 1.50 - 4.00 E9/L    Monocytes Absolute 0.55 0.10 - 0.95 E9/L    Eosinophils Absolute 0.09 0.05 - 0.50 E9/L    Basophils Absolute 0.06 0.00 - 0.20 P6/X   Basic Metabolic Panel w/ Reflex to MG   Result Value Ref Range    Sodium 136 132 - 146 mmol/L    Potassium reflex Magnesium 4.2 3.5 - 5.0 mmol/L    Chloride 105 98 - 107 mmol/L    CO2 20 (L) 22 - 29 mmol/L    Anion Gap 11 7 - 16 mmol/L    Glucose 265 (H) 74 - 99 mg/dL    BUN 21 8 - 23 mg/dL    CREATININE 1.3 (H) 0.5 - 1.0 mg/dL    GFR Non-African American 40 >=60 mL/min/1.73    GFR African American 48     Calcium 8.3 (L) 8.6 - 10.2 mg/dL   Troponin   Result Value Ref Range    Troponin <0.01 0.00 - 0.03 ng/mL   Protime-INR   Result Value Ref Range    Protime 15.3 (H) 9.3 - 12.4 sec    INR 1.3    APTT   Result Value Ref Range    aPTT 27.3 24.5 - 35.1 sec   Hemoglobin and hematocrit, blood   Result Value Ref Range    Hemoglobin 10.4 (L) 11.5 - 15.5 g/dL    Hematocrit 32.3 (L) 34.0 - 48.0 %   Basic Metabolic Panel w/ Reflex to MG   Result Value Ref Range    Sodium 137 132 - 146 mmol/L    Potassium reflex Magnesium 4.5 3.5 - 5.0 mmol/L    Chloride 110 (H) 98 - 107 mmol/L    CO2 21 (L) 22 - 29 mmol/L    Anion Gap 6 (L) 7 - 16 mmol/L    Glucose 102 (H) 74 - 99 mg/dL    BUN 20 8 - 23 mg/dL    CREATININE 1.0 0.5 - 1.0 mg/dL    GFR Non-African American 54 >=60 mL/min/1.73    GFR African American >60     Calcium 8.0 (L) 8.6 - 10.2 mg/dL   CBC   Result Value Ref Range    WBC 10.8 4.5 - 11.5 E9/L    RBC 3.17 (L) 3.50 - 5.50 E12/L    Hemoglobin 9.0 (L) 11.5 - 15.5 g/dL    Hematocrit 29.3 (L) 34.0 - 48.0 %    MCV 92.4 80.0 - 99.9 fL    MCH 28.4 26.0 - 35.0 pg    MCHC 30.7 (L) 32.0 - 34.5 %    RDW 14.6 11.5 - 15.0 fL    Platelets 491 003 - 232 E9/L    MPV 12.8 (H) 7.0 - 12.0 fL   Hemoglobin and hematocrit, blood   Result Value Ref Range    Hemoglobin 9.1 (L) 11.5 - 15.5 g/dL    Hematocrit 29.3 (L) 34.0 - 48.0 %   POCT Glucose   Result Value Ref Range    Meter Glucose 149 (H) 74 - 99 mg/dL   TYPE AND SCREEN   Result Value Ref Range    ABO/Rh O POS     Antibody Screen NEG    PREPARE RBC (CROSSMATCH), 1 Units   Result Value Ref Range    Product Code Blood Bank [id number]     Description Blood Bank Red Blood Cells, Apheresis, Leuko-reduced     Unit Number [id number]     Dispense Status Blood Bank transfused     Product Code Blood Bank [id number]     Description Blood Bank Red Blood Cells, Apheresis, Leuko-reduced     Unit Number [id number]     Dispense Status Blood Bank transfused        RADIOLOGY:  No orders to display           ------------------------- NURSING NOTES AND VITALS REVIEWED ---------------------------  Date / Time Roomed:  2/15/2021 12:05 PM  ED Bed Assignment:  [id number]    The nursing notes within the ED encounter and vital signs as below have been reviewed.      Patient Vitals for the past 24 hrs:   BP Temp Temp src Pulse Resp SpO2   02/16/21 1556 134/89 98 °F (36.7 °C) Oral 98 18 --   02/16/21 1126 138/62 98.1 °F (36.7 °C) Oral 79 20 --   02/16/21 0914 (!) 128/56 98.6 °F (37 °C) Oral 87 18 --   02/16/21 0423 127/64 98.4 °F (36.9 °C) Oral 96 18 --   02/16/21 0017 (!) 151/60 98.1 °F (36.7 °C) Oral 79 18 --   02/15/21 2021 (!) 108/58 98.1 °F (36.7 °C) Oral 90 18 97 %       Oxygen Saturation Interpretation: Normal    ------------------------------------------ PROGRESS NOTES ------------------------------------------  Re-evaluation(s):  Time: 1400  Patients symptoms are improving  Repeat physical examination is significantly improved    Counseling:  I have spoken with the patient and discussed todays results, in addition to providing specific details for the plan of care and counseling regarding the diagnosis and prognosis. Their questions are answered at this time and they are agreeable with the plan of admission.    --------------------------------- ADDITIONAL PROVIDER NOTES ---------------------------------  Consultations:  Spoke with April NP admitting for Dr. Teresa Butler. Discussed case. They will admit the patient. This patient's ED course included: a personal history and physicial examination, re-evaluation prior to disposition, multiple bedside re-evaluations, IV medications, cardiac monitoring and continuous pulse oximetry    This patient has remained hemodynamically stable during their ED course. Diagnosis:  1. Bleeding from varicose vein    2. Acute blood loss anemia    3. Hyperglycemia    4. TOSHIA (acute kidney injury) ([facility] [address]        Disposition:  Patient's disposition: Admit to telemetry  Patient's condition is fair.          Yris Virgen MD  Resident  02/16/21 0427       Yris Virgen MD  Resident  02/16/21 2894

## 2021-02-15 NOTE — ED NOTES
Emergency trauma uncrossed/unmatched blood ordered for pt due to unstable vitals     Jarvis Fabry, RN  02/15/21 2088

## 2021-02-15 NOTE — ED NOTES
Bed: 23  Expected date:   Expected time:   Means of arrival:   Comments:  aroldo Jimenez RN  02/15/21 8409

## 2021-02-15 NOTE — ED NOTES
2nd unit trauma blood started at 1300 [id number].  Verified by Kizzy Lindsay, NIKKI  02/15/21 7417

## 2021-02-16 VITALS
DIASTOLIC BLOOD PRESSURE: 89 MMHG | WEIGHT: 293 LBS | HEART RATE: 98 BPM | BODY MASS INDEX: 45.99 KG/M2 | SYSTOLIC BLOOD PRESSURE: 134 MMHG | TEMPERATURE: 98 F | RESPIRATION RATE: 18 BRPM | HEIGHT: 67 IN | OXYGEN SATURATION: 97 %

## 2021-02-16 LAB
ANION GAP SERPL CALCULATED.3IONS-SCNC: 6 MMOL/L (ref 7–16)
BUN BLDV-MCNC: 20 MG/DL (ref 8–23)
CALCIUM SERPL-MCNC: 8 MG/DL (ref 8.6–10.2)
CHLORIDE BLD-SCNC: 110 MMOL/L (ref 98–107)
CO2: 21 MMOL/L (ref 22–29)
CREAT SERPL-MCNC: 1 MG/DL (ref 0.5–1)
GFR AFRICAN AMERICAN: >60
GFR NON-AFRICAN AMERICAN: 54 ML/MIN/1.73
GLUCOSE BLD-MCNC: 102 MG/DL (ref 74–99)
HCT VFR BLD CALC: 29.3 % (ref 34–48)
HCT VFR BLD CALC: 29.3 % (ref 34–48)
HEMOGLOBIN: 9 G/DL (ref 11.5–15.5)
HEMOGLOBIN: 9.1 G/DL (ref 11.5–15.5)
MCH RBC QN AUTO: 28.4 PG (ref 26–35)
MCHC RBC AUTO-ENTMCNC: 30.7 % (ref 32–34.5)
MCV RBC AUTO: 92.4 FL (ref 80–99.9)
PDW BLD-RTO: 14.6 FL (ref 11.5–15)
PLATELET # BLD: 142 E9/L (ref 130–450)
PMV BLD AUTO: 12.8 FL (ref 7–12)
POTASSIUM REFLEX MAGNESIUM: 4.5 MMOL/L (ref 3.5–5)
RBC # BLD: 3.17 E12/L (ref 3.5–5.5)
SODIUM BLD-SCNC: 137 MMOL/L (ref 132–146)
WBC # BLD: 10.8 E9/L (ref 4.5–11.5)

## 2021-02-16 PROCEDURE — 85014 HEMATOCRIT: CPT

## 2021-02-16 PROCEDURE — 80048 BASIC METABOLIC PNL TOTAL CA: CPT

## 2021-02-16 PROCEDURE — 36415 COLL VENOUS BLD VENIPUNCTURE: CPT

## 2021-02-16 PROCEDURE — G0378 HOSPITAL OBSERVATION PER HR: HCPCS

## 2021-02-16 PROCEDURE — 85027 COMPLETE CBC AUTOMATED: CPT

## 2021-02-16 PROCEDURE — 2580000003 HC RX 258: Performed by: NURSE PRACTITIONER

## 2021-02-16 PROCEDURE — 85018 HEMOGLOBIN: CPT

## 2021-02-16 PROCEDURE — 6370000000 HC RX 637 (ALT 250 FOR IP): Performed by: NURSE PRACTITIONER

## 2021-02-16 RX ADMIN — ACETAMINOPHEN 650 MG: 325 TABLET ORAL at 09:14

## 2021-02-16 RX ADMIN — METOPROLOL SUCCINATE 50 MG: 50 TABLET, EXTENDED RELEASE ORAL at 09:14

## 2021-02-16 RX ADMIN — ACETAMINOPHEN 650 MG: 325 TABLET ORAL at 00:58

## 2021-02-16 RX ADMIN — SODIUM CHLORIDE: 9 INJECTION, SOLUTION INTRAVENOUS at 06:16

## 2021-02-16 RX ADMIN — LEVOTHYROXINE SODIUM 150 MCG: 75 TABLET ORAL at 06:08

## 2021-02-16 ASSESSMENT — PAIN SCALES - GENERAL
PAINLEVEL_OUTOF10: 6
PAINLEVEL_OUTOF10: 3

## 2021-02-16 ASSESSMENT — PAIN - FUNCTIONAL ASSESSMENT: PAIN_FUNCTIONAL_ASSESSMENT: ACTIVITIES ARE NOT PREVENTED

## 2021-02-16 ASSESSMENT — PAIN DESCRIPTION - ORIENTATION
ORIENTATION: RIGHT
ORIENTATION: RIGHT

## 2021-02-16 ASSESSMENT — PAIN DESCRIPTION - PROGRESSION
CLINICAL_PROGRESSION: NOT CHANGED
CLINICAL_PROGRESSION: NOT CHANGED

## 2021-02-16 ASSESSMENT — PAIN DESCRIPTION - LOCATION: LOCATION: FOOT

## 2021-02-16 ASSESSMENT — PAIN DESCRIPTION - DESCRIPTORS: DESCRIPTORS: STABBING;THROBBING

## 2021-02-16 NOTE — CARE COORDINATION
Met with patient about diagnosis and discharge plan of care. Pt lives with spouse. Has wheeled walker, raised toilet seat and tub bench.   Pt lives with spouse, plan is home with no needs-YAMILE

## 2021-02-16 NOTE — DISCHARGE SUMMARY
Physician Discharge Summary     Patient ID:  Brittany Green  [id number]  78 y.o.  1945    Admit date: 2/15/2021    Discharge date and time: 2/16/2021    Admission Diagnoses:   Patient Active Problem List   Diagnosis    Persistent atrial fibrillation ([facility] [address]    Primary localized osteoarthritis of right knee    Primary osteoarthritis of left knee    Osteoarthritis    Hypertension    Hashimoto's disease    GERD (gastroesophageal reflux disease)    A-fib (HCC)    Bleeding from varicose veins of right lower extremity    Atrial fibrillation (HCC)    Hypotension    History of hypertension       Discharge Diagnoses:bleeding on right foot , acute blood loss anemia     Consults: none     Procedures: stitches in er     Hospital Course:  78year old female admitted observation status for Bleeding from varicose veins of right lower extremity in a patient with a history of PVD,  on oac        1. Monitor for signs and symptoms of bleeding , resume Xarelto      2. Monitor hgb's every 8 hours transfuse if hgb <7.0- stable between 9 and 10      3. IV fluids     4.  Pain control     5. Medications for other co morbidities     Recent Labs     02/15/21  1227 02/15/21  2035 02/16/21  0212 02/16/21  1040   WBC 5.2  --  10.8  --    HGB 9.9* 10.4* 9.0* 9.1*   HCT 32.7* 32.3* 29.3* 29.3*     --  142  --        Recent Labs     02/15/21  1227 02/16/21  0212    137   K 4.2 4.5    110*   CO2 20* 21*   BUN 21 20   CREATININE 1.3* 1.0   CALCIUM 8.3* 8.0*       No results found. Discharge Exam:    HEENT: NCAT,  PERRLA, No JVD  Heart:  RRR, no murmurs, gallops, or rubs.   Lungs:  CTA bilaterally, no wheeze, rales or rhonchi  Abd: bowel sounds present, nontender, nondistended, no masses  Extrem:  No clubbing, cyanosis, or edema    Disposition: home     Patient Condition at Discharge: stable     Patient Instructions:      Medication List      CONTINUE taking these medications    levothyroxine 150 MCG tablet  Commonly known as: Synthroid  Take 1 tablet by mouth daily. LORazepam 1 MG tablet  Commonly known as: ATIVAN     metoprolol succinate 50 MG extended release tablet  Commonly known as: TOPROL XL     rivaroxaban 20 MG Tabs tablet  Commonly known as: XARELTO          Activity: activity as tolerated  Diet: regular diet    Pt has been advised to: Follow-up with Crystal Rodriguez DO in 1 week.   Follow-up with consultants as recommended by them    Note that over 30 minutes was spent in preparing discharge papers, discussing discharge with patient, medication review, etc.    Signed:  Sadiq Maldonado MD  2/16/2021  1:43 PM

## 2021-02-16 NOTE — H&P
[address] [facility]  History and Physical      CHIEF COMPLAINT:    Chief Complaint   Patient presents with    Varicose Veins     bleeding- top R foot        Patient of Rochelle Oscar DO presents with:  Bleeding from varicose veins of right lower extremity    History of Present Illness:    Patient is a 78year old female that has a history of Afib, HTN, PVD, and takes xarelto. Patient was at home when she noticed that her varicose vein of right lower extremity was bleeding a lot after she got out of the shower. Patient was frightened and awoke her  to call 911. Patient was transported to the ER with pressure on vein. ER physician placed two sutures and controlled the bleeding. Patient states cause of bleed is unknown and this has never happened to her before. Patient was transfused with two units of trauma blood while in the ER. Patient right lower extremity is dressed and patient requested that I do not remove the dressing she was afraid that it would begin to bleed again. Patient denies any chest pain, shortness of breath, fever, or chills. REVIEW OF SYSTEMS:  Pertinent negatives are above in HPI. 10 point ROS otherwise negative. Past Medical History:   Diagnosis Date    A-fib ([facility] [address]     GERD (gastroesophageal reflux disease)     Hashimoto's disease     Hypertension     Osteoarthritis, knee     bilateral    PVD (peripheral vascular disease) ([facility] [address]          Past Surgical History:   Procedure Laterality Date    FOOT TENDON SURGERY Left     KNEE ARTHROPLASTY Right 11/07/2017    total    [place] TOTAL KNEE ARTHROPLASTY Left 3/15/2018    LEFT TOTAL JOINT ARTHROPLASTY   KNEE (BIOMET)  --PNB-- performed by Yuniel Johnston MD at [facility] Left 03/15/2018       Medications Prior to Admission:    Medications Prior to Admission: LORazepam (ATIVAN) 1 MG tablet, Take 1 mg by mouth nightly.    rivaroxaban (XARELTO) 20 MG TABS tablet, Take 20 mg by mouth Daily.  metoprolol (TOPROL-XL) 50 MG XL tablet, Take 50 mg by mouth 2 times daily Instructed to take am of procedure  levothyroxine (SYNTHROID) 150 MCG tablet, Take 1 tablet by mouth daily. Note that the patient's home medications were reviewed and the above list is accurate to the best of my knowledge at the time of the exam.    Allergies:    Adhesive tape and Clindamycin/lincomycin    Social History:    reports that she quit smoking about 19 years ago. She has never used smokeless tobacco. She reports that she does not drink alcohol or use drugs. Family History:   family history includes Cancer in her mother; Diabetes in her mother; Heart Failure in her father; Kidney Disease in her sister; Thyroid Disease in her mother. PHYSICAL EXAM:    Vitals:  BP (!) 108/58   Pulse 90   Temp 98.1 °F (36.7 °C) (Oral)   Resp 18   Ht 5' 7\" (1.702 m)   Wt 293 lb (132.9 kg)   SpO2 97%   BMI 45.89 kg/m²       General appearance: NAD, conversant  Eyes: Sclerae anicteric, PERRLA  HEENT: AT/NC, MMM  Neck: FROM, supple, no thyromegaly  Lymph: No cervical / supraclavicular lymphadenopathy  Lungs: Clear to auscultation, WOB normal  CV: RRR, no MRGs, no lower extremity edema  Abdomen: Soft, non-tender; no masses or HSM, +BS  Extremities: FROM without synovitis. No clubbing or cyanosis of the hands. Dressing on right lower extremity with 2+ pedal pulses. - stitches on right dorsum  foot   Skin: no rash, induration, lesions, or ulcers  Psych: Calm and cooperative. Normal judgement and insight. Normal mood and affect. Neuro: Alert and interactive, face symmetric, speech fluent. LABS:  All labs reviewed.   Of note:  CBC with Differential:    Lab Results   Component Value Date    WBC 5.2 02/15/2021    RBC 3.43 02/15/2021    HGB 10.4 02/15/2021    HCT 32.3 02/15/2021     02/15/2021    MCV 95.3 02/15/2021    MCH 28.9 02/15/2021    MCHC 30.3 02/15/2021    RDW 14.5 02/15/2021    SEGSPCT 70 08/04/2013    LYMPHOPCT 33.3 02/15/2021    MONOPCT 10.6 02/15/2021    BASOPCT 1.2 02/15/2021    MONOSABS 0.55 02/15/2021    LYMPHSABS 1.73 02/15/2021    EOSABS 0.09 02/15/2021    BASOSABS 0.06 02/15/2021     CMP:    Lab Results   Component Value Date     02/15/2021    K 4.2 02/15/2021     02/15/2021    CO2 20 02/15/2021    BUN 21 02/15/2021    CREATININE 1.3 02/15/2021    GFRAA 48 02/15/2021    LABGLOM 40 02/15/2021    GLUCOSE 265 02/15/2021    CALCIUM 8.3 02/15/2021       ASSESSMENT/PLAN:  Principal Problem:    Bleeding from varicose veins of right lower extremity  Active Problems:    Hashimoto's disease    GERD (gastroesophageal reflux disease)    Atrial fibrillation (HCC)    Hypotension    History of hypertension  Resolved Problems:    * No resolved hospital problems. *    78 year old female admitted observation status for Bleeding from varicose veins of right lower extremity in a patient with a history of PVD. 1.  Monitor for signs and symptoms of bleeding , resume Xarelto     2. Monitor hgb's every 8 hours transfuse if hgb <7.0- stable between 9 and 10     3. IV fluids    4. Pain control    5. Medications for other co morbidities continue as appropriate with dosage adjustments as necessary. DVT Prophylaxis  PT OT  Discharge Planning         Code status: Full  Requires inpatient level of care  Jennifer Cooks Learn  APRN-BC  10:23 PM  2/15/2021     Discussed with ehr the need To continue  Xarelto d/t her history of afib and [id number] vasc score   Advised to monitor for further bleeding   monitor through the night for bleed   Unclear why she receiev d\" trauma blood \" - her hgb was 9.9 on arrival       I personally saw, examined and provided care for the patient. Radiographs, labs and medication list were reviewed by me independently. The case was discussed in detail and plans for care were established. Review of [address] [place] [address], documentation was conducted and revisions were made as appropriate directly by me.  I agree with the above documented exam, problem list, and plan of care.      Archie Gresham MD  1:45 PM  2/15/2021

## 2021-03-06 LAB
ANAEROBIC CULTURE: NORMAL
ANAEROBIC CULTURE: NORMAL
WOUND/ABSCESS: NORMAL

## 2021-03-07 LAB
ORGANISM: ABNORMAL
WOUND/ABSCESS: ABNORMAL

## 2021-03-16 ENCOUNTER — HOSPITAL ENCOUNTER (OUTPATIENT)
Dept: WOUND CARE | Age: 76
Discharge: HOME | End: 2021-03-16
Payer: MEDICARE

## 2021-03-16 VITALS
BODY MASS INDEX: 45.99 KG/M2 | TEMPERATURE: 97.2 F | RESPIRATION RATE: 20 BRPM | HEIGHT: 67 IN | WEIGHT: 293 LBS | DIASTOLIC BLOOD PRESSURE: 72 MMHG | SYSTOLIC BLOOD PRESSURE: 128 MMHG | HEART RATE: 78 BPM

## 2021-03-16 DIAGNOSIS — I83.899 BLEEDING FROM VARICOSE VEIN: Chronic | ICD-10-CM

## 2021-03-16 PROCEDURE — 99203 OFFICE O/P NEW LOW 30 MIN: CPT | Performed by: SURGERY

## 2021-03-16 PROCEDURE — 99213 OFFICE O/P EST LOW 20 MIN: CPT

## 2021-03-16 RX ORDER — ACETAMINOPHEN 160 MG
TABLET,DISINTEGRATING ORAL
COMMUNITY

## 2021-03-16 RX ORDER — DOXYCYCLINE HYCLATE 100 MG/1
100 CAPSULE ORAL 2 TIMES DAILY
COMMUNITY

## 2021-03-16 ASSESSMENT — PAIN SCALES - GENERAL: PAINLEVEL_OUTOF10: 0

## 2021-03-17 ENCOUNTER — TELEPHONE (OUTPATIENT)
Dept: VASCULAR SURGERY | Age: 76
End: 2021-03-17

## 2021-03-17 NOTE — TELEPHONE ENCOUNTER
Referral for sclerotherapy left on [facility]' voicemail; pt notified.

## 2021-05-17 PROBLEM — I83.899 BLEEDING FROM VARICOSE VEIN: Chronic | Status: ACTIVE | Noted: 2021-05-17

## 2021-05-17 NOTE — PROGRESS NOTES
[facility] Followup Visit Note    Referring Physician : Taylor Dove  [facility]  MEDICAL RECORD NUMBER:  [id number]  AGE: 78 y.o. GENDER: female  : 1945  EPISODE DATE:  3/16/2021    Subjective:     Chief Complaint   Patient presents with    Wound Check     RIGHT DORSAL FOOT      HISTORY of PRESENT ILLNESS HPI   Vicki Rowland is a 78 y.o. female who presents today in regards to follow up evaluation and treatment of wound/ulcer. That patient's past medical, family and social hx were reviewed and changes were made if present. History of Wound Context: Patient with a history of varicose veins. Overall she is doing well. She describes varicose veins and some episodes of bleeding. She denies any chest pain shortness of breath or discomfort. She denies any history of blood clots or bleeding disorders. She is on anticoagulation for atrial fibrillation. She told me that she had bleeding from her lower extremity. This was controlled with pressure. And a stitch. She presents to evaluate her lower extremity varicose veins.     Wound/Ulcer Pain Timing/Severity: none  Quality of pain: dull  Severity:  2 / 10   Modifying Factors: None  Associated Signs/Symptoms: Episode of bleeding    Ulcer Identification:  Ulcer Type: venous  Contributing Factors: edema, venous stasis, chronic pressure, decreased mobility, shear force and obesity    Diabetic/Pressure/Non Pressure Ulcers only:  Ulcer: N/A    Wound: N/A        PAST MEDICAL HISTORY      Diagnosis Date    A-fib ([address]     GERD (gastroesophageal reflux disease)     Hashimoto's disease     Hypertension     Osteoarthritis, knee     bilateral    PVD (peripheral vascular disease) ([facility])      Past Surgical History:   Procedure Laterality Date    FOOT TENDON SURGERY Left     KNEE ARTHROPLASTY Right 2017    total    [place] TOTAL KNEE ARTHROPLASTY Left 3/15/2018    LEFT TOTAL JOINT ARTHROPLASTY   KNEE (BIOMET)  --PN[facility]-- performed by Bill Morton Serenity Yanez MD at [facility] Left 03/15/2018     Family History   Problem Relation Age of Onset    Diabetes Mother     Cancer Mother     Thyroid Disease Mother     Heart Failure Father     Kidney Disease Sister      Social History     Tobacco Use    Smoking status: Former Smoker     Quit date: 2001     Years since quittin.8    Smokeless tobacco: Never Used   Vaping Use    Vaping Use: Never used   Substance Use Topics    Alcohol use: No    Drug use: No     Allergies   Allergen Reactions    Adhesive Tape Rash    Clindamycin/Lincomycin Rash     Current Outpatient Medications on File Prior to Encounter   Medication Sig Dispense Refill    Cholecalciferol (VITAMIN D3) 50 MCG ( UT) CAPS Take by mouth      doxycycline hyclate (VIBRAMYCIN) 100 MG capsule Take 100 mg by mouth 2 times daily      LORazepam (ATIVAN) 1 MG tablet Take 1 mg by mouth nightly.  metoprolol (TOPROL-XL) 50 MG XL tablet Take 50 mg by mouth 2 times daily Instructed to take am of procedure      levothyroxine (SYNTHROID) 150 MCG tablet Take 1 tablet by mouth daily. 30 tablet 3    rivaroxaban (XARELTO) 20 MG TABS tablet Take 20 mg by mouth Daily. No current facility-administered medications on file prior to encounter. REVIEW OF SYSTEMS See HPI    Objective:    /72   Pulse 78   Temp 97.2 °F (36.2 °C) (Temporal)   Resp 20   Ht 5' 7\" (1.702 m)   Wt 293 lb (132.9 kg)   BMI 45.89 kg/m²   Wt Readings from Last 3 Encounters:   21 293 lb (132.9 kg)   02/15/21 293 lb (132.9 kg)   06/10/19 270 lb (122.5 kg)     PHYSICAL EXAM  CONSTITUTIONAL:   Awake, alert, cooperative   EYES:  lids and lashes normal   ENT: external ears and nose without lesions   NECK:  supple, symmetrical, trachea midline   SKIN:  Open wound Absent   Cardiac: Is currently regular rate and rhythm on today's examination there was no irregular heartbeats.   Pulmonary: Clear to auscultation bilaterally no wheezes rales or rhonchi  Abdomen: Soft nontender no rebound or guarding. Positive bowel sounds  Extremities: Bilateral palpable brachial radial pulses. DP PTs are palpable. Confirmed with multiphasic signals. She has a right lower extremity wound that is currently healed with a scab over top of it. This is from a bleeding varicose vein. Motor and sensation are intact there is no vascular deficit. Neuro: Is grossly intact bilateral any gross cranial nerve deficit  Psych: Affect: Judgment: Mentation appear to be appropriate. She is in good spirits on today's examination. Assessment:     Problem List Items Addressed This Visit     Bleeding from varicose vein (Chronic)        #1 isolated bleeding varicose vein. At this point I recommend sclerotherapy. She is about 300 pounds she probably has some form of venous reflux disease. However this is very localized. I think a standard injection of Sotradecol would most likely remedy the segment combined with compression stockings. We will see if we can get her approval.  Most likely she will have to have this done at a outside facility. Other complicating features of the fact that she is taking anticoagulation secondary to her atrial arrhythmia. I think compression stockings long-term will be the best bet combined with sclerotherapy.     Pre Debridement Measurements:  Are located in the [place]  Documentation Flow Sheet  Post Debridement Measurements:  Wound/Ulcer Descriptions are Pre Debridement except measurements:     Wound 11/08/13 Venous ulcer Left;Medial #1 Lt medial calf  onset 10/8/13  venous (Active)   Number of days: 2747       Wound 11/08/13 Venous ulcer Ankle Left;Medial #2 Lt medial ankle  onset 2/1/13  venous (Active)   Number of days: 2747       Incision 11/07/17 Knee Right (Active)   Number of days: 1286       Incision 03/15/18 Knee Left (Active)   Number of days: 1158       Wound 03/16/21 Foot Right;Dorsal WOUND 1 (Active)   Wound Image

## 2023-03-09 NOTE — CARE COORDINATION
Social Work:    Jameel Hernandez is know to social work from prior surgery. She was also visited in [facility] prior to surgery. Plan is home with [facility]. Jameel Hernandez has all needed DME.     Electronically signed by FESTUS Zarate on 3/16/2018 at 11:20 AM 157.48

## 2024-05-02 ENCOUNTER — TRANSCRIBE ORDERS (OUTPATIENT)
Dept: ADMINISTRATIVE | Age: 79
End: 2024-05-02

## 2024-05-02 DIAGNOSIS — E04.1 THYROID NODULE: Primary | ICD-10-CM

## 2024-05-17 ENCOUNTER — HOSPITAL ENCOUNTER (OUTPATIENT)
Dept: ULTRASOUND IMAGING | Age: 79
End: 2024-05-17
Payer: MEDICARE

## 2024-05-17 DIAGNOSIS — E04.1 THYROID NODULE: ICD-10-CM

## 2024-05-17 PROCEDURE — 76536 US EXAM OF HEAD AND NECK: CPT

## 2025-06-05 ENCOUNTER — TRANSCRIBE ORDERS (OUTPATIENT)
Dept: ADMINISTRATIVE | Age: 80
End: 2025-06-05

## 2025-06-05 DIAGNOSIS — M54.9 DORSALGIA: ICD-10-CM

## 2025-06-05 DIAGNOSIS — M48.061 SPINAL STENOSIS, LUMBAR REGION, WITHOUT NEUROGENIC CLAUDICATION: Primary | ICD-10-CM

## 2025-06-27 ENCOUNTER — HOSPITAL ENCOUNTER (OUTPATIENT)
Dept: MRI IMAGING | Age: 80
Discharge: HOME | End: 2025-06-29
Payer: MEDICARE

## 2025-06-27 DIAGNOSIS — M48.061 SPINAL STENOSIS, LUMBAR REGION, WITHOUT NEUROGENIC CLAUDICATION: ICD-10-CM

## 2025-06-27 DIAGNOSIS — M54.9 DORSALGIA: ICD-10-CM

## 2025-06-27 PROCEDURE — 72148 MRI LUMBAR SPINE W/O DYE: CPT

## (undated) DEVICE — INTENDED FOR TISSUE SEPARATION, AND OTHER PROCEDURES THAT REQUIRE A SHARP SURGICAL BLADE TO PUNCTURE OR CUT.: Brand: BARD-PARKER ® STAINLESS STEEL BLADES

## (undated) DEVICE — BNDG,ELSTC,MATRIX,STRL,6"X5YD,LF,HOOK&LP: Brand: MEDLINE

## (undated) DEVICE — SOLUTION IV IRRIG WATER 1000ML POUR BRL 2F7114

## (undated) DEVICE — BANDAGE COMPR W6INXL12FT SMOOTH FOR LIMB EXSANG ESMARCH

## (undated) DEVICE — SYRINGE MED 50ML LUERLOCK TIP

## (undated) DEVICE — PACK PROCEDURE SURG GEN CUST

## (undated) DEVICE — Device: Brand: STABLECUT®

## (undated) DEVICE — DRESSING PETRO W3XL8IN OIL EMUL N ADH GZ KNIT IMPREG CELOS

## (undated) DEVICE — CONVERTORS STOCKINETTE: Brand: CONVERTORS

## (undated) DEVICE — BASIC SINGLE BASIN 1-LF: Brand: MEDLINE INDUSTRIES, INC.

## (undated) DEVICE — ZIMMER® STERILE DISPOSABLE TOURNIQUET CUFF WITH PLC, DUAL PORT, SINGLE BLADDER, 34 IN. (86 CM)

## (undated) DEVICE — Device: Brand: POWER-FLO®

## (undated) DEVICE — BLADE CLIPPER GEN PURP NS

## (undated) DEVICE — DRAPE,REIN 53X77,STERILE: Brand: MEDLINE

## (undated) DEVICE — ROLL COT W11.5INXL11FT 1LB HIGHLY ABSRB SURG GRD

## (undated) DEVICE — TIP SGL SPRY BIOMATERIALS OPN AND ENDO CONCL SPRY OUTPT

## (undated) DEVICE — TUBING, SUCTION, 9/32" X 10', STRAIGHT: Brand: MEDLINE

## (undated) DEVICE — TOWEL,OR,DSP,ST,BLUE,STD,6/PK,12PK/CS: Brand: MEDLINE

## (undated) DEVICE — GOWN,SIRUS,FABRNF,XL,20/CS: Brand: MEDLINE

## (undated) DEVICE — TUBE IRRIG HNDPC HI FLO TP INTRPULS W/SUCTION TUBE

## (undated) DEVICE — KIT SURG PREP POVIDONE IOD PRESATURATED PAINT WET FOR UNIV

## (undated) DEVICE — SPONGE,DRAIN,NONWVN,4"X4",6PLY,STRL,LF: Brand: MEDLINE

## (undated) DEVICE — PADDING,UNDERCAST,COTTON, 4"X4YD STERILE: Brand: MEDLINE

## (undated) DEVICE — CHLORAPREP 26ML ORANGE

## (undated) DEVICE — CATHETER URETH 16FR BLLN 5CC SIL HYDRGEL 2 W F LUBRICIOUS

## (undated) DEVICE — Device

## (undated) DEVICE — KIT EVAC 400CC DIA1/8IN H PAT 12.5IN 3 SPR RND SHP PVC DRN

## (undated) DEVICE — 3M™ IOBAN™ 2 ANTIMICROBIAL INCISE DRAPE 6650EZ: Brand: IOBAN™ 2

## (undated) DEVICE — TOTAL KNEE PK

## (undated) DEVICE — SYRINGE MED 20ML STD CLR PLAS LUERLOCK TIP N CTRL DISP

## (undated) DEVICE — DRAPE,TOP,102X53,STERILE: Brand: MEDLINE

## (undated) DEVICE — COVER HNDL LT DISP

## (undated) DEVICE — SOLUTION IV 1000ML 0.9% SOD CHL PH 5 INJ USP VIAFLX PLAS

## (undated) DEVICE — STRIP,CLOSURE,WOUND,MEDI-STRIP,1/2X4: Brand: MEDLINE

## (undated) DEVICE — SUTURE STRATAFIX SYMMETRIC PDS + SZ 1 L18IN ABSRB VLT OS-6 SXPP1A201

## (undated) DEVICE — POSITIONER ALVERADO KNEE

## (undated) DEVICE — STERILE HOOK LOCK LATEX FREE ELASTIC BANDAGE 4INX5YD: Brand: HOOK LOCK™

## (undated) DEVICE — 3M™ STERI-DRAPE™ U-DRAPE 1015: Brand: STERI-DRAPE™

## (undated) DEVICE — GAUZE,SPONGE,4"X4",8PLY,STRL,LF,10/TRAY: Brand: MEDLINE

## (undated) DEVICE — KIT SURG W7XL11IN 2 PKT UNTREATED NA

## (undated) DEVICE — SPONGE LAP W18XL18IN WHT COT 4 PLY FLD STRUNG RADPQ DISP ST

## (undated) DEVICE — 2108 SERIES SAGITTAL BLADE FAN, OFFSET  (34.5 X 0.8 X 64.0MM)

## (undated) DEVICE — 4-PORT MANIFOLD: Brand: NEPTUNE 2

## (undated) DEVICE — STERILE VELCLOSE ELASTIC BANDAGE, 6IN X 10 YARDS: Brand: VELCLOSE

## (undated) DEVICE — DOUBLE BASIN SET: Brand: MEDLINE INDUSTRIES, INC.

## (undated) DEVICE — ELECTRODE PT RET AD L9FT HI MOIST COND ADH HYDRGEL CORDED

## (undated) DEVICE — BANDAGE COBAN 4 IN COMPR W4INXL5YD FOAM COHESIVE QUIK STK SELF ADH SFT

## (undated) DEVICE — STANDARD HYPODERMIC NEEDLE,POLYPROPYLENE HUB: Brand: MONOJECT

## (undated) DEVICE — BLADE SAW W11XL77.5MM THK1.23MM CUT THK1.17MM S STL RECIP